# Patient Record
Sex: FEMALE | Race: WHITE | Employment: OTHER | URBAN - METROPOLITAN AREA
[De-identification: names, ages, dates, MRNs, and addresses within clinical notes are randomized per-mention and may not be internally consistent; named-entity substitution may affect disease eponyms.]

---

## 2023-10-20 ENCOUNTER — APPOINTMENT (OUTPATIENT)
Dept: ULTRASOUND IMAGING | Facility: CLINIC | Age: 63
DRG: 857 | End: 2023-10-20
Attending: STUDENT IN AN ORGANIZED HEALTH CARE EDUCATION/TRAINING PROGRAM
Payer: MEDICARE

## 2023-10-20 ENCOUNTER — APPOINTMENT (OUTPATIENT)
Dept: GENERAL RADIOLOGY | Facility: CLINIC | Age: 63
DRG: 857 | End: 2023-10-20
Attending: EMERGENCY MEDICINE
Payer: MEDICARE

## 2023-10-20 ENCOUNTER — HOSPITAL ENCOUNTER (INPATIENT)
Facility: CLINIC | Age: 63
LOS: 4 days | Discharge: HOME | DRG: 857 | End: 2023-10-24
Attending: STUDENT IN AN ORGANIZED HEALTH CARE EDUCATION/TRAINING PROGRAM | Admitting: STUDENT IN AN ORGANIZED HEALTH CARE EDUCATION/TRAINING PROGRAM
Payer: MEDICARE

## 2023-10-20 DIAGNOSIS — L03.032 CELLULITIS OF TOE OF LEFT FOOT: ICD-10-CM

## 2023-10-20 DIAGNOSIS — S91.109D OPEN WOUND OF TOE, SUBSEQUENT ENCOUNTER: ICD-10-CM

## 2023-10-20 DIAGNOSIS — I73.9 PAD (PERIPHERAL ARTERY DISEASE) (H): Primary | Chronic | ICD-10-CM

## 2023-10-20 LAB
ALBUMIN SERPL BCG-MCNC: 4.1 G/DL (ref 3.5–5.2)
ALP SERPL-CCNC: 92 U/L (ref 35–104)
ALT SERPL W P-5'-P-CCNC: 18 U/L (ref 0–50)
ANION GAP SERPL CALCULATED.3IONS-SCNC: 8 MMOL/L (ref 7–15)
AST SERPL W P-5'-P-CCNC: 21 U/L (ref 0–45)
BILIRUB SERPL-MCNC: 0.3 MG/DL
BUN SERPL-MCNC: 10 MG/DL (ref 8–23)
CALCIUM SERPL-MCNC: 9.4 MG/DL (ref 8.8–10.2)
CHLORIDE SERPL-SCNC: 102 MMOL/L (ref 98–107)
CHOLEST SERPL-MCNC: 184 MG/DL
CREAT SERPL-MCNC: 0.46 MG/DL (ref 0.51–0.95)
CRP SERPL-MCNC: 7.41 MG/L
DEPRECATED HCO3 PLAS-SCNC: 27 MMOL/L (ref 22–29)
EGFRCR SERPLBLD CKD-EPI 2021: >90 ML/MIN/1.73M2
ERYTHROCYTE [DISTWIDTH] IN BLOOD BY AUTOMATED COUNT: 14.7 % (ref 10–15)
ERYTHROCYTE [SEDIMENTATION RATE] IN BLOOD BY WESTERGREN METHOD: 8 MM/HR (ref 0–30)
GLUCOSE SERPL-MCNC: 122 MG/DL (ref 70–99)
HBA1C MFR BLD: 5.6 %
HCT VFR BLD AUTO: 40.1 % (ref 35–47)
HDLC SERPL-MCNC: 50 MG/DL
HGB BLD-MCNC: 13.4 G/DL (ref 11.7–15.7)
HOLD SPECIMEN: NORMAL
LDLC SERPL CALC-MCNC: 118 MG/DL
MCH RBC QN AUTO: 33.1 PG (ref 26.5–33)
MCHC RBC AUTO-ENTMCNC: 33.4 G/DL (ref 31.5–36.5)
MCV RBC AUTO: 99 FL (ref 78–100)
NONHDLC SERPL-MCNC: 134 MG/DL
PLATELET # BLD AUTO: 145 10E3/UL (ref 150–450)
POTASSIUM SERPL-SCNC: 3.7 MMOL/L (ref 3.4–5.3)
PROT SERPL-MCNC: 6.5 G/DL (ref 6.4–8.3)
RBC # BLD AUTO: 4.05 10E6/UL (ref 3.8–5.2)
SODIUM SERPL-SCNC: 137 MMOL/L (ref 135–145)
TRIGL SERPL-MCNC: 78 MG/DL
WBC # BLD AUTO: 7.2 10E3/UL (ref 4–11)

## 2023-10-20 PROCEDURE — 80053 COMPREHEN METABOLIC PANEL: CPT | Performed by: STUDENT IN AN ORGANIZED HEALTH CARE EDUCATION/TRAINING PROGRAM

## 2023-10-20 PROCEDURE — 80053 COMPREHEN METABOLIC PANEL: CPT | Performed by: EMERGENCY MEDICINE

## 2023-10-20 PROCEDURE — 85027 COMPLETE CBC AUTOMATED: CPT | Performed by: EMERGENCY MEDICINE

## 2023-10-20 PROCEDURE — 80061 LIPID PANEL: CPT | Performed by: STUDENT IN AN ORGANIZED HEALTH CARE EDUCATION/TRAINING PROGRAM

## 2023-10-20 PROCEDURE — 250N000011 HC RX IP 250 OP 636: Mod: JZ | Performed by: STUDENT IN AN ORGANIZED HEALTH CARE EDUCATION/TRAINING PROGRAM

## 2023-10-20 PROCEDURE — 99285 EMERGENCY DEPT VISIT HI MDM: CPT | Mod: 25

## 2023-10-20 PROCEDURE — 120N000001 HC R&B MED SURG/OB

## 2023-10-20 PROCEDURE — 99221 1ST HOSP IP/OBS SF/LOW 40: CPT | Performed by: PODIATRIST

## 2023-10-20 PROCEDURE — 250N000013 HC RX MED GY IP 250 OP 250 PS 637: Performed by: STUDENT IN AN ORGANIZED HEALTH CARE EDUCATION/TRAINING PROGRAM

## 2023-10-20 PROCEDURE — 85027 COMPLETE CBC AUTOMATED: CPT | Performed by: STUDENT IN AN ORGANIZED HEALTH CARE EDUCATION/TRAINING PROGRAM

## 2023-10-20 PROCEDURE — 85652 RBC SED RATE AUTOMATED: CPT | Performed by: STUDENT IN AN ORGANIZED HEALTH CARE EDUCATION/TRAINING PROGRAM

## 2023-10-20 PROCEDURE — 250N000011 HC RX IP 250 OP 636: Performed by: STUDENT IN AN ORGANIZED HEALTH CARE EDUCATION/TRAINING PROGRAM

## 2023-10-20 PROCEDURE — 93922 UPR/L XTREMITY ART 2 LEVELS: CPT

## 2023-10-20 PROCEDURE — 36415 COLL VENOUS BLD VENIPUNCTURE: CPT | Performed by: STUDENT IN AN ORGANIZED HEALTH CARE EDUCATION/TRAINING PROGRAM

## 2023-10-20 PROCEDURE — 93925 LOWER EXTREMITY STUDY: CPT

## 2023-10-20 PROCEDURE — 258N000003 HC RX IP 258 OP 636: Performed by: STUDENT IN AN ORGANIZED HEALTH CARE EDUCATION/TRAINING PROGRAM

## 2023-10-20 PROCEDURE — 83036 HEMOGLOBIN GLYCOSYLATED A1C: CPT | Performed by: STUDENT IN AN ORGANIZED HEALTH CARE EDUCATION/TRAINING PROGRAM

## 2023-10-20 PROCEDURE — 87040 BLOOD CULTURE FOR BACTERIA: CPT | Performed by: STUDENT IN AN ORGANIZED HEALTH CARE EDUCATION/TRAINING PROGRAM

## 2023-10-20 PROCEDURE — 36415 COLL VENOUS BLD VENIPUNCTURE: CPT | Performed by: EMERGENCY MEDICINE

## 2023-10-20 PROCEDURE — 99223 1ST HOSP IP/OBS HIGH 75: CPT | Mod: AI | Performed by: STUDENT IN AN ORGANIZED HEALTH CARE EDUCATION/TRAINING PROGRAM

## 2023-10-20 PROCEDURE — 86140 C-REACTIVE PROTEIN: CPT | Performed by: STUDENT IN AN ORGANIZED HEALTH CARE EDUCATION/TRAINING PROGRAM

## 2023-10-20 PROCEDURE — 73630 X-RAY EXAM OF FOOT: CPT | Mod: LT

## 2023-10-20 RX ORDER — HYDROCODONE BITARTRATE AND ACETAMINOPHEN 5; 325 MG/1; MG/1
1 TABLET ORAL EVERY 6 HOURS PRN
Status: DISCONTINUED | OUTPATIENT
Start: 2023-10-20 | End: 2023-10-24 | Stop reason: HOSPADM

## 2023-10-20 RX ORDER — ASPIRIN 81 MG/1
81 TABLET ORAL DAILY
Status: DISCONTINUED | OUTPATIENT
Start: 2023-10-20 | End: 2023-10-24 | Stop reason: HOSPADM

## 2023-10-20 RX ORDER — ACYCLOVIR 400 MG/1
400 TABLET ORAL DAILY
Status: DISCONTINUED | OUTPATIENT
Start: 2023-10-20 | End: 2023-10-24 | Stop reason: HOSPADM

## 2023-10-20 RX ORDER — SERTRALINE HYDROCHLORIDE 100 MG/1
100 TABLET, FILM COATED ORAL DAILY
COMMUNITY

## 2023-10-20 RX ORDER — ONDANSETRON 2 MG/ML
4 INJECTION INTRAMUSCULAR; INTRAVENOUS EVERY 6 HOURS PRN
Status: DISCONTINUED | OUTPATIENT
Start: 2023-10-20 | End: 2023-10-24 | Stop reason: HOSPADM

## 2023-10-20 RX ORDER — ACETAMINOPHEN 325 MG/1
650 TABLET ORAL EVERY 4 HOURS PRN
Status: DISCONTINUED | OUTPATIENT
Start: 2023-10-20 | End: 2023-10-23

## 2023-10-20 RX ORDER — ONDANSETRON 4 MG/1
4 TABLET, ORALLY DISINTEGRATING ORAL EVERY 6 HOURS PRN
Status: DISCONTINUED | OUTPATIENT
Start: 2023-10-20 | End: 2023-10-24 | Stop reason: HOSPADM

## 2023-10-20 RX ORDER — ATORVASTATIN CALCIUM 20 MG/1
20 TABLET, FILM COATED ORAL DAILY
Status: ON HOLD | COMMUNITY
End: 2023-10-24

## 2023-10-20 RX ORDER — ATORVASTATIN CALCIUM 40 MG/1
80 TABLET, FILM COATED ORAL EVERY EVENING
Status: DISCONTINUED | OUTPATIENT
Start: 2023-10-20 | End: 2023-10-24 | Stop reason: HOSPADM

## 2023-10-20 RX ORDER — POLYETHYLENE GLYCOL 3350 17 G/17G
17 POWDER, FOR SOLUTION ORAL DAILY PRN
Status: DISCONTINUED | OUTPATIENT
Start: 2023-10-20 | End: 2023-10-24 | Stop reason: HOSPADM

## 2023-10-20 RX ORDER — HYDROCODONE BITARTRATE AND ACETAMINOPHEN 5; 325 MG/1; MG/1
1 TABLET ORAL EVERY 6 HOURS PRN
Status: ON HOLD | COMMUNITY
Start: 2023-10-16 | End: 2023-10-24

## 2023-10-20 RX ORDER — NICOTINE 21 MG/24HR
1 PATCH, TRANSDERMAL 24 HOURS TRANSDERMAL DAILY
Status: DISCONTINUED | OUTPATIENT
Start: 2023-10-20 | End: 2023-10-24 | Stop reason: HOSPADM

## 2023-10-20 RX ORDER — AMLODIPINE BESYLATE 10 MG/1
10 TABLET ORAL DAILY
COMMUNITY

## 2023-10-20 RX ORDER — PANTOPRAZOLE SODIUM 40 MG/1
40 TABLET, DELAYED RELEASE ORAL DAILY
Status: DISCONTINUED | OUTPATIENT
Start: 2023-10-20 | End: 2023-10-24 | Stop reason: HOSPADM

## 2023-10-20 RX ORDER — CETIRIZINE HYDROCHLORIDE 10 MG/1
10 TABLET ORAL DAILY
COMMUNITY

## 2023-10-20 RX ORDER — CEFEPIME HYDROCHLORIDE 2 G/1
2 INJECTION, POWDER, FOR SOLUTION INTRAVENOUS EVERY 8 HOURS
Status: DISCONTINUED | OUTPATIENT
Start: 2023-10-20 | End: 2023-10-24

## 2023-10-20 RX ORDER — CEPHALEXIN 500 MG/1
500 CAPSULE ORAL 4 TIMES DAILY
Status: ON HOLD | COMMUNITY
Start: 2023-10-16 | End: 2023-10-24

## 2023-10-20 RX ORDER — ATORVASTATIN CALCIUM 20 MG/1
20 TABLET, FILM COATED ORAL EVERY EVENING
Status: DISCONTINUED | OUTPATIENT
Start: 2023-10-20 | End: 2023-10-20

## 2023-10-20 RX ORDER — ACYCLOVIR 400 MG/1
400 TABLET ORAL DAILY
COMMUNITY

## 2023-10-20 RX ORDER — AMLODIPINE BESYLATE 10 MG/1
10 TABLET ORAL DAILY
Status: DISCONTINUED | OUTPATIENT
Start: 2023-10-20 | End: 2023-10-24 | Stop reason: HOSPADM

## 2023-10-20 RX ORDER — SERTRALINE HYDROCHLORIDE 100 MG/1
100 TABLET, FILM COATED ORAL DAILY
Status: DISCONTINUED | OUTPATIENT
Start: 2023-10-20 | End: 2023-10-24 | Stop reason: HOSPADM

## 2023-10-20 RX ADMIN — ATORVASTATIN CALCIUM 80 MG: 40 TABLET, FILM COATED ORAL at 21:52

## 2023-10-20 RX ADMIN — ONDANSETRON 4 MG: 2 INJECTION INTRAMUSCULAR; INTRAVENOUS at 19:07

## 2023-10-20 RX ADMIN — CEFEPIME HYDROCHLORIDE 2 G: 2 INJECTION, POWDER, FOR SOLUTION INTRAVENOUS at 15:54

## 2023-10-20 RX ADMIN — PANTOPRAZOLE SODIUM 40 MG: 40 TABLET, DELAYED RELEASE ORAL at 18:22

## 2023-10-20 RX ADMIN — ACETAMINOPHEN 650 MG: 325 TABLET, FILM COATED ORAL at 21:57

## 2023-10-20 RX ADMIN — AMLODIPINE BESYLATE 10 MG: 10 TABLET ORAL at 18:22

## 2023-10-20 RX ADMIN — ACYCLOVIR 400 MG: 400 TABLET ORAL at 21:52

## 2023-10-20 RX ADMIN — ASPIRIN 81 MG: 81 TABLET, COATED ORAL at 21:52

## 2023-10-20 RX ADMIN — NICOTINE 1 PATCH: 21 PATCH, EXTENDED RELEASE TRANSDERMAL at 18:23

## 2023-10-20 RX ADMIN — SERTRALINE HYDROCHLORIDE 100 MG: 100 TABLET ORAL at 18:22

## 2023-10-20 RX ADMIN — VANCOMYCIN HYDROCHLORIDE 1500 MG: 10 INJECTION, POWDER, LYOPHILIZED, FOR SOLUTION INTRAVENOUS at 18:48

## 2023-10-20 ASSESSMENT — ACTIVITIES OF DAILY LIVING (ADL)
ADLS_ACUITY_SCORE: 35

## 2023-10-20 NOTE — CONSULTS
[facility]  CONSULTATION NOTE    CHIEF COMPLAINT:      I was asked by Elana Hernandez MD  to evaluate this patient for left foot    PATIENT HISTORY:  Pili Cuevas is a 63 year old female  with a past medical history significant for what's listed below. She presented for worsening left hallux pain and redness, following an ingrown toenail removal approx 1 week ago by her primary care doctor. She states this was done in [place]. No records available for review in our system. States her PCP then sent her to her podiatrist, who sent her to vascular located in [place]. Per patient, vascular stated she did have some level of vascular disease, but without need for surgery. No documentation for these encounters are available, that I can see.   -At bedside, states site is tender, particularly adjacent skin. No history of diabetes. Does have a history of smoking and ovarian cancer, requiring chemotherapy approx 2 years ago. She now has recurrence of the ovarian cancer with plans to start chemo again soon.         Review of Systems:  A 10 point review of systems was performed and is positive for that noted above in the patient history.  All other areas are negative.     PAST MEDICAL HISTORY:   Past Medical History:   Diagnosis Date    Depression     History of ovarian cancer     S/p surgery and chemo 2021, now with reoccurance 2023    HLD (hyperlipidemia)     HTN (hypertension)         PAST SURGICAL HISTORY:   Past Surgical History:   Procedure Laterality Date    C6-7 fusion      HYSTERECTOMY TOTAL ABDOMINAL  2021    Instrumented arthrodesis of the first MTP joint with osseous fusion          MEDICATIONS:  Reviewed in Epic. Current.     ALLERGIES:    Allergies   Allergen Reactions    Epinephrine     Trazodone Dizziness        SOCIAL HISTORY:   Social History     Socioeconomic History    Marital status: Single     Spouse name: Not on file    Number of children: Not on file    Years of  "education: Not on file    Highest education level: Not on file   Occupational History    Not on file   Tobacco Use    Smoking status: Every Day     Packs/day: 1     Types: Cigarettes    Smokeless tobacco: Not on file   Substance and Sexual Activity    Alcohol use: Not on file    Drug use: Not on file    Sexual activity: Not on file   Other Topics Concern    Not on file   Social History Narrative    Not on file     Social Determinants of Health     Financial Resource Strain: Not on file   Food Insecurity: Not on file   Transportation Needs: Not on file   Physical Activity: Not on file   Stress: Not on file   Social Connections: Not on file   Interpersonal Safety: Not on file   Housing Stability: Not on file        FAMILY HISTORY: No family history on file.     EXAM:Vitals: BP (!) 174/88   Pulse 68   Temp 97.9  F (36.6  C) (Temporal)   Resp 16   Ht 1.707 m (5' 7.2\")   Wt 90.7 kg (200 lb)   SpO2 95%   BMI 31.14 kg/m    BMI= Body mass index is 31.14 kg/m .    LABS:     Last Comprehensive Metabolic Panel:  Sodium   Date Value Ref Range Status   10/20/2023 137 135 - 145 mmol/L Final     Comment:     Reference intervals for this test were updated on 09/26/2023 to more accurately reflect our healthy population. There may be differences in the flagging of prior results with similar values performed with this method. Interpretation of those prior results can be made in the context of the updated reference intervals.      Potassium   Date Value Ref Range Status   10/20/2023 3.7 3.4 - 5.3 mmol/L Final     Chloride   Date Value Ref Range Status   10/20/2023 102 98 - 107 mmol/L Final     Carbon Dioxide (CO2)   Date Value Ref Range Status   10/20/2023 27 22 - 29 mmol/L Final     Anion Gap   Date Value Ref Range Status   10/20/2023 8 7 - 15 mmol/L Final     Glucose   Date Value Ref Range Status   10/20/2023 122 (H) 70 - 99 mg/dL Final     Urea Nitrogen   Date Value Ref Range Status   10/20/2023 10.0 8.0 - 23.0 mg/dL Final " "    Creatinine   Date Value Ref Range Status   10/20/2023 0.46 (L) 0.51 - 0.95 mg/dL Final     GFR Estimate   Date Value Ref Range Status   10/20/2023 >90 >60 mL/min/1.73m2 Final     Calcium   Date Value Ref Range Status   10/20/2023 9.4 8.8 - 10.2 mg/dL Final     Lab Results   Component Value Date    WBC 7.2 10/20/2023     Lab Results   Component Value Date    RBC 4.05 10/20/2023     Lab Results   Component Value Date    HGB 13.4 10/20/2023     Lab Results   Component Value Date    HCT 40.1 10/20/2023     Lab Results   Component Value Date     10/20/2023      No results found for: \"INR\"     General appearance: Patient is alert and fully cooperative with history & exam.  No sign of distress is noted during the visit.      Respiratory: Breathing is regular & unlabored while sitting.      HEENT: Hearing is intact to spoken word.  Speech is clear.  No gross evidence of visual impairment that would impact ambulation.      Dermatologic: Left hallux ischemia s/p nail avulsion. Distal medial digit with the most pronounced devascularization. Not full thickness. No purulence. Minimal ascending cellulitis to level of MPJ. Distal hallux slightly violaceous. Not cool touch.      Vascular: Dorsalis pedis and posterior tibial pulses are weakly palpable.       Neurologic: Lower extremity sensation is diminished, bilateral foot, to light touch.  No evidence of neurological-based weakness or contracture in the lower extremities.       Musculoskeletal: Patient is ambulatory without an assistive device or brace.  No gross foot or ankle deformity noted.       Psychiatric: Affect is pleasant & appropriate.      All cultures:  No results for input(s): \"CULTURE\" in the last 168 hours.     IMAGING:     IMPRESSION:  1.  No fracture, focal bone erosion, or joint malalignment.  2.  Instrumented arthrodesis of the first MTP joint with osseous  fusion. The hardware is intact.  3.  Achilles calcaneal spur.    I personally reviewed the " images and agree with the reports as stated.  No obvious signs of osteomyelitis     ASSESSMENT:  Left hallux devascularization following nail excision   Peripheral arterial disease      MEDICAL DECISION MAKING:   -Discussed all findings with patient. Chart and imaging reviewed.   -No obvious signs of osteomyelitis on xray. Clinically no significant signs of deep space infection.   -Discussed with her signs of ischemia which if continues to worsen, could ultimately lead to an amputation.   -Consult placed to vascular for assistance. Will hold off on order vascular studies, in case records can be obtained from outside facility, but discussed with patient they may need to be redone.   -Cont IV abx   -Okay to leave out to air for now.   -No weightbearing restrictions.       Thank you for the consultation request and the opportunity to participate in the care of Pili Royal DPM   [facility] of Podiatry/Foot & Ankle Surgery  [phone]

## 2023-10-20 NOTE — H&P
[facility]  History and Physical - Hospitalist Service       Date of Admission:  10/20/2023  PRIMARY CARE PROVIDER:    Nickie Boyd    Assessment & Plan   Pili STOUT Faith is a 63 year old female with a past medical history significant for ovarian cancer s/p surgery and chemo 2021 with reoccurrence in 2023 not currently on treatment, HTN, HLD, oral herpes, depression who presented to the ED on 10/20/23 for left toe pain. Patient was admitted on 10/20/23 left big toe cellulitis.     Left Big Toe Cellulitis   ?Possible Ischemia of Left Big Toe  PAD   Had toenail removed on 10/13/23 with PCP, started to worsen over the weekend. Saw PCP on 10/16/23 who had Podiatry see her, who started Abx and referred to Vascular Surgery. Was diagnosed with PAD but no intervention to be done. Pain, swelling and redness has been getting worse despite outpt abx. Patient is afebrile, vitally stable, has no WBC but does have an elevated CRP. Xray not showing osteo at this time but concerning for ?necrotic area on toe. Hx of history left first MTP.      - CRP: 7.41, ESR: 8   - Blood cx pending       - Podiatry Consulted     - Consulted Vascular Surgery      - Start Cefepime and Vancomycin    - Continue home Hydrocodone 3-352mg Q6H PRN     Chronic Medical Problems:     HTN   - Continue home Amlodipine 10mg daily     GERD   - Continue home Omeprazole 20mg daily    Depression    - Continue home Zoloft 100mg daily     Oral Herpes   - Continue home Acyclovir 400mg daily     Tobacco Abuse  Smokes a pack of day   - Nicotine patch ordered     Ovarian Cancer   Initially diagnosed in March 2021. Underwent MEENU and chemo. Was in remission until a reoccurrence was found in September 2023. She is in the process of moving to [place] and has an appointment with [facility] for next steps at the end of the month.       Clinically Significant Risk Factors Present on Admission                       #  "Obesity: Estimated body mass index is 31.14 kg/m  as calculated from the following:    Height as of this encounter: 1.707 m (5' 7.2\").    Weight as of this encounter: 90.7 kg (200 lb).               Diet: Combination Diet Regular Diet Adult  DVT Prophylaxis: Pneumatic Compression Devices  Perez Catheter: Not present  Lines: None     Cardiac Monitoring: None  Code Status: Full Code         Disposition Plan      Expected Discharge Date: 10/21/2023             Entered: Elana Hernandez MD 10/20/2023, 3:15 PM       Elana Hernandez MD  Hospitalist Service   [facility]  Securely message with the Vocera Web Console (learn more here)  Text page via [employer] Paging/Directory   ______________________________________________________________    Chief Complaint   Left Toe Pain    History is obtained from the patient and EMR.      History of Present Illness   Pili Cuevas is a 63 year old female with a past medical history significant for ovarian cancer s/p surgery and chemo 2021 with reoccurrence in 2023 not currently on treatment, HTN, HLD, oral herpes, depression who presented to the ED on 10/20/23 for left toe pain.    (Unable to see records for PCP or Vascular Surgery)     The patient states that on Friday 10/13/23 her PCP removed her left big toe toenail due to it being overgrown. She states that over the weekend some redness had developed. She was told to soak her left foot in warm water. On Monday 10/16/23 she saw her PCP again who was worried with how it was looking. Her PCP had Podiatry look at it. Patient states that Podiatry cleaned it and recommend that she see Vascular Surgery. Podiatry also prescribed Keflex 500mg QID for 1 week. She saw Vascular on 10/17/23 who told her that she has PAD but does not need surgery and it should not interfere with her healing. She states that the redness, swelling and pain slowly got worse. The redness started to track down her old surgical scar. " The swelling started to go into her ankle and the pain is now present at rest. She also states that the dark spot near the tip of the toe has maybe gotten worse. She denies fevers and chills.     Hx of history left first MTP.     Does report a history of right thigh cellulitis where she had 2 hospitalizations and extended abx therapy.     10/14/23 Pre-Soak      10/14/23 Post Soak      10/20/23 in ED      10/20/23 in ED      In the ED:   Vitals upon arrival:   Temp: 97.9F, BP: 172/93, HR: 75, RR: 16. Spo2: 96% on RA     Labs:   Na:137, K: 3.7, Cl:102, CO2: 27  BUN:10, Cr: 0.46    WBC: 7.2, Hgb: 13.4, Plt: 145    Tbili: 0.3, AST: 21, ALT: 18, ALKP: 92     Imaging:     CXR:   1.  No fracture, focal bone erosion, or joint malalignment.  2.  Instrumented arthrodesis of the first MTP joint with osseous  fusion. The hardware is intact.  3.  Achilles calcaneal spur.    In the ED, she was given a dose of Vancomycin.     Upon my evaluation, the patient is laying in bed with family at bedside. She intermittently winces in pain. States that she is doing okay.     Patient recently diagnosed with reoccurrence of her Ovarian cancer in Sept 2023. She is in the process of moving to [place] and has an appointment with [facility] for next steps at the end of the month.     Currently smokes roughly a pack a day. Drink 3-5 beers 2-3 times a week. No illicit drugs.     Currently denies headache, subjective fevers, chills, lightheadedness, chest pain SOB, abdominal pain, nausea, vomiting.       Past Medical History    I have reviewed this patient's medical history and updated it with pertinent information if needed.   Past Medical History:   Diagnosis Date    Depression     History of ovarian cancer     S/p surgery and chemo 2021, now with reoccurance 2023    HLD (hyperlipidemia)     HTN (hypertension)        Prior to Admission Medications   None     Allergies   Allergies   Allergen Reactions    Epinephrine      Trazodone Dizziness       Physical Exam   Vital Signs: Temp: 97.9  F (36.6  C) Temp src: Temporal BP: (!) 174/88 Pulse: 68   Resp: 16 SpO2: 95 %      Weight: 200 lbs 0 oz    Constitutional: Awake, alert, cooperative, intermittently wincing in pain   ENT: Normocephalic, without obvious abnormality, atraumatic, oral pharynx with moist mucus membranes.  Eyes pupils are equal, round and reactive to light; extra occular movements intact.  Normal sclera.    Pulmonary: No increased work of breathing, good air exchange, clear to auscultation bilaterally, no crackles or wheezing.  Cardiovascular: Regular rate and rhythm, normal S1 and S2  GI: Normal bowel sounds, soft, non-distended, non-tender.  Obese.  Skin/Integumen: Please see pictures below   Neuro: Moves all 4 extremities   Psych:  Alert and oriented x 3. Normal affect.  Extremities:   L Big Toe: red, swollen, tender, black/dark area near tip, redness tracking down old surgical scar   LLE: swelling in ankle and into calf, does appear more swollen than R leg   : Perez catheter in place with clear yellow urine in the bag.                Medical Decision Making       Please see A&P for additional details of medical decision making.  65 MINUTES SPENT BY ME on the date of service doing chart review, history, exam, documentation & further activities per the note.         Data   Data reviewed today: I reviewed all medications, new labs and imaging results over the last 24 hours. I personally reviewed no images or EKG's today.      I have personally reviewed the following data over the past 24 hrs:    7.2  \   13.4   / 145 (L)     137 102 10.0 /  122 (H)   3.7 27 0.46 (L) \     ALT: 18 AST: 21 AP: 92 TBILI: 0.3   ALB: 4.1 TOT PROTEIN: 6.5 LIPASE: N/A       Imaging results reviewed over the past 24 hrs:   Recent Results (from the past 24 hour(s))   Foot XR, G/E 3 views, left    Narrative    FOOT LEFT THREE OR MORE VIEWS   DATE/TIME: 10/20/2023 11:33 AM     INDICATION: Left  foot infection.   COMPARISON: None.      Impression    IMPRESSION:  1.  No fracture, focal bone erosion, or joint malalignment.  2.  Instrumented arthrodesis of the first MTP joint with osseous  fusion. The hardware is intact.  3.  Achilles calcaneal spur.       COCO MCCOLLUM MD         SYSTEM ID:  [id number]

## 2023-10-20 NOTE — ED TRIAGE NOTES
Had left big toe nail removed a week ago. Patient taking Keflex since 10/16. Went to vascular doctor this week and was found to have vascular issues to left leg. Patient has ovarian cancer- not undergoing chemo or treatment at this time.    Wound to left foot worsening, redness and swelling going up further into foot.    7:15AM today took hydrocodone.

## 2023-10-20 NOTE — PHARMACY-ADMISSION MEDICATION HISTORY
Pharmacist Admission Medication History    Admission medication history is complete. The information provided in this note is only as accurate as the sources available at the time of the update.    Information Source(s): Patient and CareEverywhere/SureScripts via in-person    Pertinent Information:   Recent antimicrobials:  On 10/16, patient was prescribed Keflex for SSTI with instructions of QID x 7 days. Patient has taken 17 caps of the 28 dose course of antibiotics so far and has 11 doses remaining. Course will be completed on 10/23    Changes made to PTA medication list:  Added: All meds added to list  Deleted: None  Changed: None    Allergies reviewed with patient and updates made in EHR: yes    Medication History Completed By: Bhavna Lauren RPH 10/20/2023 4:06 PM    PTA Med List   Medication Sig Last Dose    acyclovir (ZOVIRAX) 400 MG tablet Take 400 mg by mouth daily 10/19/2023    amLODIPine (NORVASC) 10 MG tablet Take 10 mg by mouth daily 10/19/2023    atorvastatin (LIPITOR) 20 MG tablet Take 20 mg by mouth daily 10/19/2023    cephALEXin (KEFLEX) 500 MG capsule Take 500 mg by mouth 4 times daily 10/20/2023 at x 2 doses    cetirizine (ZYRTEC) 10 MG tablet Take 10 mg by mouth daily 10/19/2023    HYDROcodone-acetaminophen (NORCO) 5-325 MG tablet Take 1 tablet by mouth every 6 hours as needed for pain 10/20/2023 at AM    omeprazole (PRILOSEC) 20 MG DR capsule Take 20 mg by mouth daily 10/19/2023    sertraline (ZOLOFT) 100 MG tablet Take 100 mg by mouth daily 10/19/2023

## 2023-10-20 NOTE — ED NOTES
"[facility]  ED Nurse Handoff Report    ED Chief complaint: Wound Check      ED Diagnosis:   Final diagnoses:   Cellulitis of toe of left foot       Code Status: to be addressed by admitting MD    Allergies:   Allergies   Allergen Reactions    Epinephrine     Trazodone Dizziness       Patient Story: 63 year old with cellulitis of the left great toe following surgical removal of ingrown toe nails earlier this week.  Patient also ovarian cancer for the second time and a visit to [place] is pending, she is not on chemo.     Focused Assessment:  awake, alert, approriate, left great toe has a black nail and reddness going up the dorsum     Treatments and/or interventions provided: IV antibiotics    Patient's response to treatments and/or interventions:     To be done/followed up on inpatient unit:      Does this patient have any cognitive concerns?:       Activity level - Baseline/Home:  Independent  Activity Level - Current:   Stand with Assist    Patient's Preferred language: English   Needed?: No    Isolation: None  Infection: Not Applicable  Patient tested for COVID 19 prior to admission: NO  Bariatric?: No    Vital Signs:   Vitals:    10/20/23 1035 10/20/23 1458 10/20/23 1459   BP: (!) 172/93 (!) 174/88    Pulse: 75 68    Resp: 16     Temp: 97.9  F (36.6  C)     TempSrc: Temporal     SpO2: 96%  95%   Weight: 90.7 kg (200 lb)     Height: 1.707 m (5' 7.2\")         Cardiac Rhythm:     Was the PSS-3 completed:   Yes  What interventions are required if any?               Family Comments: sister at bedside, supportive  OBS brochure/video discussed/provided to patient/family: N/A              Name of person given brochure if not patient:               Relationship to patient:     For the majority of the shift this patient's behavior was Green.   Behavioral interventions performed were .    ED NURSE PHONE NUMBER: [phone]         "

## 2023-10-20 NOTE — PHARMACY-VANCOMYCIN DOSING SERVICE
Pharmacy Vancomycin Initial Note  Date of Service 2023  Patient's  1960  63 year old, female    Indication: Skin and Soft Tissue Infection    Current estimated CrCl = Estimated Creatinine Clearance: 149 mL/min (A) (based on SCr of 0.46 mg/dL (L)).    Creatinine for last 3 days  10/20/2023: 11:01 AM Creatinine 0.46 mg/dL    Nephrotoxins and other renal medications (From now, onward)      Start     Dose/Rate Route Frequency Ordered Stop    10/20/23 2000  acyclovir (ZOVIRAX) tablet 400 mg        Note to Pharmacy: PTA Sig:Take 400 mg by mouth daily      400 mg Oral DAILY 10/20/23 1611      10/20/23 1800  vancomycin (VANCOCIN) 1,500 mg in 0.9% NaCl 250 mL intermittent infusion         1,500 mg  over 90 Minutes Intravenous EVERY 12 HOURS 10/20/23 1702            [employer] Prediction of Planned Initial Vancomycin Regimen  Regimen: 1500 mg IV every 12 hours.  Start time: 17:51 on 10/20/2023  Exposure target: AUC24 (range)400-600 mg/L.hr   AUC24,ss: 549 mg/L.hr  Probability of AUC24 > 400: 81 %  Ctrough,ss: 16.3 mg/L  Probability of Ctrough,ss > 20: 34 %  Probability of nephrotoxicity (Lodise CARY ): 12 %          Plan:  Start vancomycin  1500 mg IV q12h.   Vancomycin monitoring method: AUC  Vancomycin therapeutic monitoring goal: 400-600 mg*h/L  Pharmacy will check vancomycin levels as appropriate in 1-3 Days.    Serum creatinine levels will be ordered every 48 hours.      Karlos Ospina RP

## 2023-10-20 NOTE — PLAN OF CARE
Goal Outcome Evaluation:    Pt has past medical history significant for ovarian cancer s/p surgery and chemo 2021 with reoccurrence in 2023 not currently on treatment, HTN, HLD, oral herpes, depression who presented to the ED on 10/20/23 for left toe pain. Patient was admitted today for left big toe cellulitis. A&Ox4.  Up independently.  Able to bear weight on LLE.  Left big toe reddened with tenderness, swelling and warmth.  Rates 2/10 tingling pain in Left big toe.  Pedal and posttibial pulses dopplerable.  On regular diet; ordered dinner.  VSS except HTNsive and T-99.3.  Received prn IV Zofran for c/o nausea.  Seen by Vascular Surgery this shift.  Went down for Ultrasound-LLE.  On IV Vancomycin.  Blood cx pending.

## 2023-10-20 NOTE — ED PROVIDER NOTES
"    History     Chief Complaint:  Wound Check       HPI   Pili Cuevas is a 63 year old female history of stage IV ovarian cancer s/p surgery chemo 21 with now recurrence but not currently on treatment, hypertension, lipidemia, presenting with left toe pain and erythema.  Patient had her toenail removed 1 week ago.  Few days after she developed pain and redness so was started on Keflex and she was seen by podiatry.  Patient also saw vascular surgery 2 days prior and was told that she has peripheral vascular disease but does not require surgery.  She presents today because despite antibiotics she has continued pain with big toe and the redness is spreading.  She has had surgery prior on the left first MTP.  No fevers, chills, or body aches.  No shortness of breath.      Independent Historian:    none    Review of External Notes:  none      Medications:    No current outpatient medications on file.      Past Medical History:    Past Medical History:   Diagnosis Date    Depression     History of ovarian cancer     HLD (hyperlipidemia)     HTN (hypertension)        Past Surgical History:    Past Surgical History:   Procedure Laterality Date    C6-7 fusion      HYSTERECTOMY TOTAL ABDOMINAL  2021    Instrumented arthrodesis of the first MTP joint with osseous fusion            Physical Exam   Patient Vitals for the past 24 hrs:   BP Temp Temp src Pulse Resp SpO2 Height Weight   10/20/23 1459 -- -- -- -- -- 95 % -- --   10/20/23 1458 (!) 174/88 -- -- 68 -- -- -- --   10/20/23 1035 (!) 172/93 97.9  F (36.6  C) Temporal 75 16 96 % 1.707 m (5' 7.2\") 90.7 kg (200 lb)        Physical Exam  GENERAL: Patient well-appearing  HEAD: Atraumatic.  NECK: No rigidity  CV: RRR, no murmurs rubs or gallops  PULM: CTAB with good aeration; no retractions, rales, rhonchi, or wheezing  ABD: Soft, nontender, nondistended, no guarding  DERM: Left first toe-no crepitus or bullae.  The distal nailbed has dark material with the may be necrosis " versus healing scabbed wound.  There is no foul smell.  No pus drainage.  EXTREMITY: Tenderness to palpation and swelling of the left first toe.  Can flex and extend left first toe.  VASCULAR: Left DP pulse 1+.      Emergency Department Course        Imaging:  Foot XR, G/E 3 views, left   Final Result   IMPRESSION:   1.  No fracture, focal bone erosion, or joint malalignment.   2.  Instrumented arthrodesis of the first MTP joint with osseous   fusion. The hardware is intact.   3.  Achilles calcaneal spur.          COCO MCCOLLUM MD            SYSTEM ID:  [id number]        Report per radiology    Laboratory:  Labs Ordered and Resulted from Time of ED Arrival to Time of ED Departure   CBC WITH PLATELETS - Abnormal       Result Value    WBC Count 7.2      RBC Count 4.05      Hemoglobin 13.4      Hematocrit 40.1      MCV 99      MCH 33.1 (*)     MCHC 33.4      RDW 14.7      Platelet Count 145 (*)    COMPREHENSIVE METABOLIC PANEL - Abnormal    Sodium 137      Potassium 3.7      Carbon Dioxide (CO2) 27      Anion Gap 8      Urea Nitrogen 10.0      Creatinine 0.46 (*)     GFR Estimate >90      Calcium 9.4      Chloride 102      Glucose 122 (*)     Alkaline Phosphatase 92      AST 21      ALT 18      Protein Total 6.5      Albumin 4.1      Bilirubin Total 0.3     CRP INFLAMMATION - Abnormal    CRP Inflammation 7.41 (*)    ERYTHROCYTE SEDIMENTATION RATE AUTO - Normal    Erythrocyte Sedimentation Rate 8     BLOOD CULTURE   BLOOD CULTURE             Emergency Department Course & Assessments:             Interventions:  Medications   melatonin tablet 1 mg (has no administration in time range)   HYDROcodone-acetaminophen (NORCO) 5-325 MG per tablet 1 tablet (has no administration in time range)   acetaminophen (TYLENOL) tablet 650 mg (has no administration in time range)   polyethylene glycol (MIRALAX) Packet 17 g (has no administration in time range)   ondansetron (ZOFRAN ODT) ODT tab 4 mg (has no administration in time range)      Or   ondansetron (ZOFRAN) injection 4 mg (has no administration in time range)   nicotine Patch in Place (has no administration in time range)   nicotine (NICODERM CQ) 21 MG/24HR 24 hr patch 1 patch (has no administration in time range)   ceFEPIme (MAXIPIME) 2 g vial to attach to  mL bag for ADULTS or 50 mL bag for PEDS (2 g Intravenous $New Bag 10/20/23 6443)           Independent Interpretation (X-rays, CTs, rhythm strip):  X-ray foot no fracture or gas.    Consultations/Discussion of Management or Tests:  Dr. Hernandez hospitalist       Social Determinants of Health affecting care:  none     Disposition:  The patient was admitted to the hospital under the care of Dr. Hernandez.     Impression & Plan         Medical Decision Making:  Symptoms consistent with cellulitis.      Chronic conditions complicating -ovarian cancer.  Prior extended course of antibiotics for a skin infection.    DDx considered sepsis, osteomyelitis, necrotizing skin infection, compartment syndrome, DVT.    Do not see signs of DVT.  Osteomyelitis seems unlikely with such a brief course.  Patient not septic.  Doubt NSTI.  She does have an area over the distal toe that appears to be mild necrosis versus healing wound.  In comparison to prior imaging with picture she took the lateral aspect of the toenail looked darker but that appeared to heal up but this darker aspect she states has been there for few days.    Labs grossly unremarkable other than CRP slightly elevated.  Afebrile.  Patient has failed outpatient antibiotics so started on IV vancomycin.  She has a palpable pulse in the left foot and the rest of the foot is nontender.  Do not think she requires emergent surgery but likely podiatry evaluation on the inpatient side.    Critical Care time:  was 0 minutes for this patient excluding procedures.    Diagnosis:    ICD-10-CM    1. Cellulitis of toe of left foot  L03.032            Discharge Medications:  New Prescriptions     No medications on file          Omid Rangel MD  10/20/2023   Omid Rangel MD Foss, Kevin, MD  10/20/23 9289

## 2023-10-21 ENCOUNTER — APPOINTMENT (OUTPATIENT)
Dept: MRI IMAGING | Facility: CLINIC | Age: 63
DRG: 857 | End: 2023-10-21
Attending: PODIATRIST
Payer: MEDICARE

## 2023-10-21 PROBLEM — I73.9 PAD (PERIPHERAL ARTERY DISEASE) (H): Status: ACTIVE | Noted: 2023-10-21

## 2023-10-21 PROCEDURE — 258N000003 HC RX IP 258 OP 636: Performed by: STUDENT IN AN ORGANIZED HEALTH CARE EDUCATION/TRAINING PROGRAM

## 2023-10-21 PROCEDURE — 250N000013 HC RX MED GY IP 250 OP 250 PS 637: Performed by: STUDENT IN AN ORGANIZED HEALTH CARE EDUCATION/TRAINING PROGRAM

## 2023-10-21 PROCEDURE — 255N000002 HC RX 255 OP 636: Performed by: STUDENT IN AN ORGANIZED HEALTH CARE EDUCATION/TRAINING PROGRAM

## 2023-10-21 PROCEDURE — 99233 SBSQ HOSP IP/OBS HIGH 50: CPT | Performed by: INTERNAL MEDICINE

## 2023-10-21 PROCEDURE — A9585 GADOBUTROL INJECTION: HCPCS | Performed by: STUDENT IN AN ORGANIZED HEALTH CARE EDUCATION/TRAINING PROGRAM

## 2023-10-21 PROCEDURE — 250N000013 HC RX MED GY IP 250 OP 250 PS 637: Performed by: INTERNAL MEDICINE

## 2023-10-21 PROCEDURE — 99232 SBSQ HOSP IP/OBS MODERATE 35: CPT | Performed by: PODIATRIST

## 2023-10-21 PROCEDURE — 250N000011 HC RX IP 250 OP 636: Performed by: STUDENT IN AN ORGANIZED HEALTH CARE EDUCATION/TRAINING PROGRAM

## 2023-10-21 PROCEDURE — 120N000001 HC R&B MED SURG/OB

## 2023-10-21 PROCEDURE — 99222 1ST HOSP IP/OBS MODERATE 55: CPT | Mod: GC | Performed by: SURGERY

## 2023-10-21 PROCEDURE — 73720 MRI LWR EXTREMITY W/O&W/DYE: CPT | Mod: LT,MG

## 2023-10-21 RX ORDER — CILOSTAZOL 50 MG/1
50 TABLET ORAL 2 TIMES DAILY
Status: DISCONTINUED | OUTPATIENT
Start: 2023-10-21 | End: 2023-10-24 | Stop reason: HOSPADM

## 2023-10-21 RX ORDER — AMOXICILLIN 250 MG
2 CAPSULE ORAL 2 TIMES DAILY PRN
Status: DISCONTINUED | OUTPATIENT
Start: 2023-10-21 | End: 2023-10-24 | Stop reason: HOSPADM

## 2023-10-21 RX ORDER — GADOBUTROL 604.72 MG/ML
9 INJECTION INTRAVENOUS ONCE
Status: COMPLETED | OUTPATIENT
Start: 2023-10-21 | End: 2023-10-21

## 2023-10-21 RX ADMIN — CEFEPIME HYDROCHLORIDE 2 G: 2 INJECTION, POWDER, FOR SOLUTION INTRAVENOUS at 23:18

## 2023-10-21 RX ADMIN — AMLODIPINE BESYLATE 10 MG: 10 TABLET ORAL at 08:26

## 2023-10-21 RX ADMIN — HYDROCODONE BITARTRATE AND ACETAMINOPHEN 1 TABLET: 5; 325 TABLET ORAL at 12:33

## 2023-10-21 RX ADMIN — VANCOMYCIN HYDROCHLORIDE 1500 MG: 10 INJECTION, POWDER, LYOPHILIZED, FOR SOLUTION INTRAVENOUS at 06:06

## 2023-10-21 RX ADMIN — GADOBUTROL 9 ML: 604.72 INJECTION INTRAVENOUS at 18:26

## 2023-10-21 RX ADMIN — ATORVASTATIN CALCIUM 80 MG: 40 TABLET, FILM COATED ORAL at 20:26

## 2023-10-21 RX ADMIN — SERTRALINE HYDROCHLORIDE 100 MG: 100 TABLET ORAL at 08:26

## 2023-10-21 RX ADMIN — CILOSTAZOL 50 MG: 50 TABLET ORAL at 14:38

## 2023-10-21 RX ADMIN — POLYETHYLENE GLYCOL 3350 17 G: 17 POWDER, FOR SOLUTION ORAL at 06:07

## 2023-10-21 RX ADMIN — ACYCLOVIR 400 MG: 400 TABLET ORAL at 08:26

## 2023-10-21 RX ADMIN — CEFEPIME HYDROCHLORIDE 2 G: 2 INJECTION, POWDER, FOR SOLUTION INTRAVENOUS at 00:06

## 2023-10-21 RX ADMIN — NICOTINE 1 PATCH: 21 PATCH, EXTENDED RELEASE TRANSDERMAL at 20:30

## 2023-10-21 RX ADMIN — SENNOSIDES AND DOCUSATE SODIUM 2 TABLET: 50; 8.6 TABLET ORAL at 22:22

## 2023-10-21 RX ADMIN — ASPIRIN 81 MG: 81 TABLET, COATED ORAL at 08:26

## 2023-10-21 RX ADMIN — CEFEPIME HYDROCHLORIDE 2 G: 2 INJECTION, POWDER, FOR SOLUTION INTRAVENOUS at 08:37

## 2023-10-21 RX ADMIN — CEFEPIME HYDROCHLORIDE 2 G: 2 INJECTION, POWDER, FOR SOLUTION INTRAVENOUS at 16:09

## 2023-10-21 RX ADMIN — NICOTINE 1 PATCH: 21 PATCH, EXTENDED RELEASE TRANSDERMAL at 08:27

## 2023-10-21 RX ADMIN — PANTOPRAZOLE SODIUM 40 MG: 40 TABLET, DELAYED RELEASE ORAL at 08:27

## 2023-10-21 RX ADMIN — HYDROCODONE BITARTRATE AND ACETAMINOPHEN 1 TABLET: 5; 325 TABLET ORAL at 20:26

## 2023-10-21 RX ADMIN — CILOSTAZOL 50 MG: 50 TABLET ORAL at 20:26

## 2023-10-21 ASSESSMENT — ACTIVITIES OF DAILY LIVING (ADL)
ADLS_ACUITY_SCORE: 35

## 2023-10-21 NOTE — PLAN OF CARE
[id number]:    Orientation: A&Ox4  Activity: independent  Diet/BS Checks: Regular diet  Tele:  n/a  IV Access/Drains: PIV SL; intermittent antibiotics  Pain Management: denies pain  Abnormal VS/Results: VSS except HTN; foot US done on previous shift (see results)  Bowel/Bladder: Continent of bowel/bladder; complained of constipation, gave PRN Miralax this AM  Skin/Wounds: L great toe redness/black toenail  Consults: vascular, podiatry  D/C Disposition: TBD; unknown at this time

## 2023-10-21 NOTE — PROGRESS NOTES
[facility]      ASSESSMENT:  63-year-old female with past medical history significant for ovarian cancer, hypertension, hyperlipidemia, depression who presented to the emergency department on 10/20/2023 due to progressive pain in the left great toe.    She reports having an infected ingrown toenail removed approximately 1 week ago by her primary care physician.  Skin changes and progressive pain.  Please see podiatry consultation note by Dr. Royal for full details and other history.    Clinical exam and history of smoking, raise concern for peripheral arterial disease.  Noninvasive vascular studies were done and she was evaluated by vascular surgery.  There is some left infrapopliteal disease yet the left toe pressure is 93 mmHg.  It is thought she should have adequate circulation for healing at the toe level and no additional vascular work-up or intervention planned.    MEDICAL DECISION MAKING:  It is hard to know if the dry, hyperpigmented tissue around the nailbed of the left hallux is simply from the wound drying out versus eschar development due to microvascular disease.    The skin appears taut with some lighter discoloration along the medial skin fold.  This might suggest a superficial abscess.    Although x-ray is negative for any lytic or erosive changes, early osteomyelitis is not ruled out.      MRI, left foot, to evaluate for early osteomyelitis and/or abscess.  If MRI is concerning for osteomyelitis, given her pain and the appearance of the toe, partial left hallux amputation is a reasonable option.  She understands this.    MRI will also help determine if incision and drainage and/or excisional debridement is indicated.    It might also be reasonable to take a wait and watch approach and see how the soft tissue to demarcate, yet this can be lengthy.    She also considers the amputation option as a way to expedite recovery.    Podiatry will follow up tomorrow and review MRI  "results to help determine the more definitive care plan.    Juan Dior DPM, FACFAS, MS  M [facility] Department of Podiatry/Foot & Ankle Surgery  [phone]      _______________________________________________________________________      SUBJECTIVE:  No complaints other than left great toe pain when it's contacted.     EXAM:    B/P: 160/98, T: 98.1, P: 71, R: 16    Vascular: pedal pulses not readily palpable, left foot    Neurological: light touch sensation is intact    Musculoskeletal: no gross deformities. Let 1st MTPJ fusion     Dermatological: Erythema of the distal left hallux.  The nail bed, more medially, is hyperpigmented and dry.  Skin appears taut medially, possibly suggesting a superficial abscess.  This area is painful to the touch.    FOOT LEFT THREE OR MORE VIEWS   DATE/TIME: 10/20/2023 11:33 AM      INDICATION: Left foot infection.   COMPARISON: None.                                                                      IMPRESSION:  1.  No fracture, focal bone erosion, or joint malalignment.  2.  Instrumented arthrodesis of the first MTP joint with osseous  fusion. The hardware is intact.  3.  Achilles calcaneal spur.        COCO MCCOLLUM MD     Labs:     No results found for: \"INR\"  No components found for: \"ESR\"  @BREIFLAB(crp)@    Lab Results   Component Value Date    WBC 7.2 10/20/2023     Lab Results   Component Value Date    RBC 4.05 10/20/2023     Lab Results   Component Value Date    HGB 13.4 10/20/2023     Lab Results   Component Value Date    HCT 40.1 10/20/2023     No components found for: \"MCT\"  Lab Results   Component Value Date    MCV 99 10/20/2023     Lab Results   Component Value Date    MCH 33.1 10/20/2023     Lab Results   Component Value Date    MCHC 33.4 10/20/2023     Lab Results   Component Value Date    RDW 14.7 10/20/2023     Lab Results   Component Value Date     10/20/2023       All cultures:  No results for input(s): \"CULT\" in the last 168 " hours.    Hemoglobin   Date Value Ref Range Status   10/20/2023 13.4 11.7 - 15.7 g/dL Final

## 2023-10-21 NOTE — CONSULTS
Vascular Surgery Consultation    Pili Cuevas MRN# [id number]   Age: 63 year old YOB: 1960     Date of Admission:  10/20/2023    Date of Consult:   10/20/23    Reason for consult: Left great toe wound               Assessment and Plan:   63 year-old female bilateral short-distance calf claudication admitted with left great toe wound following nail removal last week. While she does have infrapopliteal stenosis on the left leg, NILAM of 0.73 and a toe pressure of 93 mmHg are indicative of adequate perfusion for wound healing. Recommend podiatry proceed with debridement or amputation as necessary.    She does have occlusive disease of the distal SFA on the RIGHT and lifestyle-limiting bilateral calf claudication. She is not medically optimized;  Aspirin and high-dose statin are ordered, as well as pletal. She understands the critical importance of tobacco cessation.   She is interested in supervised exercise therapy to address her claudication.    She is moving to [place], [place] and transferring all medical care to [facility] where she intends to follow with their vascular surgery team.    Vascular surgery will sign off. Please call with questions.      Discussed with staff, Dr. Juares.    Froilan Olivares MD           Chief Complaint:   Left great toe wound         History of Present Illness:   Ms. Pili Cuevas is a 63 year old female admitted with left great toe wound. An ingrown toenail was removed a week ago by PCP. She subsequently developed a wound. She does not know if this developed drainage but it became more painful. Denies fevers, chills, dyspnea, or chest pain. Was seen at [facility] two days ago where noninvasive arterial studies were reportedly done but we do not have access to these records. She notes bilateral calf claudication at one block for the last 6 months. This significantly inhibits her day to day function, unable to garden, walk to get mail etc. In face she used to be a   and has been unable to return to work since her original cancer diagnosis in part due to her short distance claudication.   She is an active smoker and has undergone total abdominal hysterectomy and chemotherapy for metastatic ovarian cancer and was recently diagnosed with recurrence. She tells me she has mets to her liver and spleen. She plans to be seen at the [facility] in the near future to discuss treatment options. She does not take aspirin or statin at home. She does not have diabetes or chronic kidney disease.              Past Medical History:     Past Medical History:   Diagnosis Date    Depression     History of ovarian cancer     S/p surgery and chemo 2021, now with reoccurance 2023    HLD (hyperlipidemia)     HTN (hypertension)              Past Surgical History:     Past Surgical History:   Procedure Laterality Date    C6-7 fusion      HYSTERECTOMY TOTAL ABDOMINAL  2021    Instrumented arthrodesis of the first MTP joint with osseous fusion               Social History:     Social History     Tobacco Use    Smoking status: Every Day     Packs/day: 1     Types: Cigarettes    Smokeless tobacco: Not on file   Substance Use Topics    Alcohol use: Not on file             Family History:   No family history on file.             Allergies:     Allergies   Allergen Reactions    Epinephrine     Trazodone Dizziness             Medications:     Current Facility-Administered Medications   Medication    acetaminophen (TYLENOL) tablet 650 mg    acyclovir (ZOVIRAX) tablet 400 mg    amLODIPine (NORVASC) tablet 10 mg    aspirin EC tablet 81 mg    atorvastatin (LIPITOR) tablet 80 mg    ceFEPIme (MAXIPIME) 2 g vial to attach to  mL bag for ADULTS or 50 mL bag for PEDS    HYDROcodone-acetaminophen (NORCO) 5-325 MG per tablet 1 tablet    melatonin tablet 1 mg    nicotine (NICODERM CQ) 21 MG/24HR 24 hr patch 1 patch    nicotine Patch in Place    ondansetron (ZOFRAN ODT) ODT tab 4 mg    Or    ondansetron (ZOFRAN)  "injection 4 mg    pantoprazole (PROTONIX) EC tablet 40 mg    polyethylene glycol (MIRALAX) Packet 17 g    sertraline (ZOLOFT) tablet 100 mg    vancomycin (VANCOCIN) 1,500 mg in 0.9% NaCl 250 mL intermittent infusion               Review of Systems:   A 12 point review of systems was completed and found to be negative unless otherwise stated in the above HPI          Physical Exam:   BP (!) 153/84 (BP Location: Right arm, Patient Position: Semi-Quiroz's, Cuff Size: Adult Regular)   Pulse 67   Temp 99.3  F (37.4  C) (Oral)   Resp 16   Ht 1.707 m (5' 7.2\")   Wt 90.7 kg (200 lb)   SpO2 95%   BMI 31.14 kg/m      No intake or output data in the 24 hours ending 10/20/23 1931    GEN: A&Ox3, no acute distress  NEURO: CN II-XII grossly intact. No gross neurologic deficits  NECK: trachea midline, no JVD  HEENT: No scleral icterus.  RESP: Nonlabored breathing on room air, no cough  CV: RRR by radial pulse, noncyanotic   ABD: soft, non-tender, nondistended. No guarding or rebound tenderness  MSK: Moves all extremities independently. Normal active range of motion.  PSYCH: cooperative   PULSES: Palpable femoral pulses. Nonpalpable poplitea and pedal pulses bilaterally. Unable to complete pulse exam with doppler evaluation as there is not a functional doppler device available on the patient's hospital floor.          Data:   All laboratory data reviewed    Results:  BMP  Recent Labs   Lab 10/20/23  1101      POTASSIUM 3.7   CHLORIDE 102   CO2 27   BUN 10.0   CR 0.46*   *     CBC  Recent Labs   Lab 10/20/23  1101   WBC 7.2   HGB 13.4   *     LFT  Recent Labs   Lab 10/20/23  1101   AST 21   ALT 18   ALKPHOS 92   BILITOTAL 0.3   ALBUMIN 4.1     Recent Labs   Lab 10/20/23  1101   *       Imaging:  Arterial duplex ultrasound demonstrates right SFA occlusion and left infrapopliteal stenosis    ABIs are 0.67 and 0.73 on the right and left, respectively  Toe pressure is 70 on the right and 93 on the " left.       Vascular surgery staff note: I seen and examined the patient myself.  I reviewed the noninvasive imaging.  Patient is a pleasant quite unfortunate 63-year-old female with stage IV ovarian cancer.  She had developed infection of the left great toe.  She does have a history of peripheral artery disease.  She has been perfusion in the left great toe that if her toe amputation is contemplated it would be nicely.  This was explained and discussed with the patient.    TONE CONRAD M.D.

## 2023-10-21 NOTE — PROGRESS NOTES
[facility]    Medicine Progress Note - Hospitalist Service    Date of Admission:  10/20/2023    Assessment & Plan   Pili Cuevas is a 63 year old female with a past medical history significant for ovarian cancer s/p surgery and chemo 2021 with reoccurrence in 2023 not currently on treatment, HTN, HLD, oral herpes, depression who presented to the ED on 10/20/23 for left toe pain. Patient was admitted on 10/20/23 left big toe cellulitis.     Left Big Toe Cellulitis   ?Possible Ischemia of Left Big Toe  PAD   Had toenail removed on 10/13/23 with PCP, started to worsen over the weekend. Saw PCP on 10/16/23 who had Podiatry see her, who started Abx and referred to Vascular Surgery. Was diagnosed with PAD but no intervention to be done. Pain, swelling and redness has been getting worse despite outpt abx. Patient is afebrile, vitally stable, has no WBC but does have an elevated CRP. Xray not showing osteo at this time but concerning for ?necrotic area on toe. Hx of history left first MTP.      - CRP: 7.41, ESR: 8   - Blood cx pending       - Podiatry Consulted might need amputation if the Toe is getting worse     - Consulted Vascular Surgery   As per vascular Surgery Noninvasive vascular studies reviewed. Left infrapopliteal disease with left NILAM 0.73 and toe pressure 93 mmHg indicating adequate perfusion for wound healing.   Recommend podiatry proceed with debridement/amputation as necessary.     - Started on Cefepime and Vancomycin. Will discharge vancomycin now continue cefepime    - Continue home Hydrocodone 3-352mg Q6H PRN     Chronic Medical Problems:     HTN   - Continue home Amlodipine 10mg daily     GERD   - Continue home Omeprazole 20mg daily    Depression    - Continue home Zoloft 100mg daily     Oral Herpes   - Continue home Acyclovir 400mg daily     Tobacco Abuse  Smokes a pack of day   - Nicotine patch ordered     Ovarian Cancer   Initially diagnosed in March 2021. Underwent MEENU  "and chemo. Was in remission until a reoccurrence was found in September 2023. She is in the process of moving to [place] and has an appointment with [facility] for next steps at the end of the month.             Diet: Combination Diet Regular Diet Adult    DVT Prophylaxis: Ambulate every shift  Perez Catheter: Not present  Lines: None     Cardiac Monitoring: None  Code Status: Full Code      Clinically Significant Risk Factors Present on Admission                       # Obesity: Estimated body mass index is 31.14 kg/m  as calculated from the following:    Height as of this encounter: 1.707 m (5' 7.2\").    Weight as of this encounter: 90.7 kg (200 lb).              Disposition Plan     Expected Discharge Date: 10/21/2023                    Olya Hamilton MD  Hospitalist Service  [facility]  Securely message with [employer] (more info)  Text page via [facility] Paging/Directory   ______________________________________________________________________    Interval History     Patient is resting comfortably in bed.  Complains of left toe pain intermittently.  Vascular surgery evaluated her and thought there is adequate perfusion for wound healing.  They recommended podiatric procedure with debridement/amputation.  She denies any chest pain or shortness of breath no other acute events in the last 24 hours.   patient is new to me today.  Chart reviewed    Physical Exam   Vital Signs: Temp: 98.1  F (36.7  C) Temp src: Oral BP: (!) 160/98 Pulse: 71   Resp: 16 SpO2: 94 % O2 Device: None (Room air)    Weight: 200 lbs 0 oz    General Appearance: Alert awake, not in acute distress pleasant female sitting comfortably in bed  Respiratory: Clear to auscultation bilaterally  Cardiovascular: Normal rate rhythm regular  GI: Soft, nontender nondistended bowel sounds positive  Skin: Left great toe rednes with some area of ischemia with black eschar noted  Other:      Medical Decision Making "       52 MINUTES SPENT BY ME on the date of service doing chart review, history, exam, documentation & further activities per the note.      Data     I have personally reviewed the following data over the past 24 hrs:    7.2  \   13.4   / 145 (L)     137 102 10.0 /  122 (H)   3.7 27 0.46 (L) \     ALT: 18 AST: 21 AP: 92 TBILI: 0.3   ALB: 4.1 TOT PROTEIN: 6.5 LIPASE: N/A     TSH: N/A T4: N/A A1C: 5.6     Procal: N/A CRP: 7.41 (H) Lactic Acid: N/A         Imaging results reviewed over the past 24 hrs:   Recent Results (from the past 24 hour(s))   Foot XR, G/E 3 views, left    Narrative    FOOT LEFT THREE OR MORE VIEWS   DATE/TIME: 10/20/2023 11:33 AM     INDICATION: Left foot infection.   COMPARISON: None.      Impression    IMPRESSION:  1.  No fracture, focal bone erosion, or joint malalignment.  2.  Instrumented arthrodesis of the first MTP joint with osseous  fusion. The hardware is intact.  3.  Achilles calcaneal spur.       COCO MCCOLLUM MD         SYSTEM ID:  [id number]   US NILAM Doppler No Exercise    Narrative    EXAM: RESTING ANKLE-BRACHIAL INDICES (ABIs)  LOCATION: [facility]  DATE: 10/20/2023    INDICATION: Left toe wound, decreased lower extremity pulses, lower extremity pain.  COMPARISON: None.    NILAM FINDINGS:  RIGHT  Brachial: 164  Ankle (PT): Occluded Index: NC  Ankle (DP): 110 Index: 0.67    LEFT  Brachial: 164  Ankle (PT): 116 Index: 0.71  Ankle (DP): 119 Index: 0.73    The right NILAM at rest is 0.67. The left NILAM at rest is 0.73.      WAVEFORMS: The dorsalis pedis and posterior tibial arteries are monophasic.      Impression    IMPRESSION:  1.  RIGHT LOWER EXTREMITY: Resting ankle-brachial index of 0.67 reflecting moderate peripheral arterial disease. Digit pressure of 70 mm Hg suggests adequate wound healing potential.  2.  LEFT LOWER EXTREMITY: Resting ankle-brachial index of 0.73 reflecting moderate peripheral arterial disease. Digit pressure of 93 mm Hg suggests adequate  wound healing potential.   US Lower Extremity Arterial Duplex Bilateral    Narrative    EXAM: US LOWER EXTREMITY ARTERIAL DUPLEX BILATERAL  LOCATION: [facility]  DATE: 10/20/2023    INDICATION: Left toe wounds, worsening pain, swelling and redness  COMPARISON: None.  TECHNIQUE: Duplex utilizing 2D gray-scale imaging, Doppler interrogation with color-flow and spectral waveform analysis.    FINDINGS:     Right - Normal multiphasic waveforms and velocities in the common femoral and superficial femoral arteries with occlusion at the level of the distal superficial femoral artery. There is reconstitution at the level of the distal popliteal artery with   blunted, monophasic waveforms below this level. Unable to visualize right peroneal artery, could be chronically occluded.    Left- Normal multiphasic waveforms and velocities in the common femoral artery through the mid popliteal artery. Scattered calcified atherosclerosis noted. Blunted, monophasic waveforms seen in the distal popliteal and calf arteries, although no distinct   stenotic area is visualized.    RIGHT LOWER EXTREMITY ARTERIAL ASSESSMENT:    Common femoral artery: 63 cm/s  Profunda femoris artery: 73 cm/s  SFA (proximal): 58 cm/s  SFA (mid): 40 cm/s  SFA (distal): Occluded  Popliteal artery (proximal): Occluded  Popliteal artery (distal): 21 cm/s  Anterior tibial artery: 45 cm/s  Posterior tibial artery: 7 cm/s  Peroneal artery: Not visualized.  Dorsalis pedis artery: 52 cm/s    LEFT LOWER EXTREMITY ARTERIAL ASSESSMENT:    Common femoral artery: 110 cm/s  Profunda femoris artery: 108 cm/s  SFA (proximal): 111 cm/s  SFA (mid): 90 cm/s  SFA (distal): 133 cm/s  Popliteal artery (proximal): 75 cm/s  Popliteal artery (distal): 39 cm/s  Anterior tibial artery: 113 cm/s  Posterior tibial artery: 48 cm/s  Peroneal artery: 41 cm/s  Dorsalis pedis artery: 44 cm/s        Impression    IMPRESSION:  1.  Occlusion of the right distal superficial  femoral artery with reconstitution the level of the distal popliteal artery. Blunted, monophasic waveforms below this point.  2.  Blunted, monophasic waveforms below the knee in the left lower extremity without focal stenosis or occlusion visualized.

## 2023-10-21 NOTE — PLAN OF CARE
Goal Outcome Evaluation:    DATE AND TIME: 10/21/23  [id number]     Orientation: A&Ox4  Activity: Independent  Diet/BS Checks: Regular diet  Tele:  n/a  IV Access/Drains: PIV SL; intermittent antibiotics  Pain Management: Rated pain at 3/10; received PRN Norco-effective  Abnormal VS/Results: VSS except HTN; foot US done yesterday; To have MR of Left Foot this evening to determine I&D vs. Amputation of left great toe.  CRP-7.41, LDL-118.  Bowel/Bladder: Continent of bowel/bladder; received PRN Miralax on previous shift; had formed BM x1 this shift.  Skin/Wounds: L great toe with tenderness, swelling, warmth and reddened/black nailbed  Consults: vascular, podiatry  D/C Disposition: TBD; unknown at this time

## 2023-10-21 NOTE — PLAN OF CARE
Goal Outcome Evaluation:  Summary:  ad for left toe pain.   Hx of  ovarian cancer s/p surgery and chemo 2021 with reoccurrence in 2023 not currently on treatment, HTN, HLD, oral herpes, depression    Primary Diagnosis: Left Big Toe Cellulitis   Orientation: A&Ox4.  Up  Aggression Stop Light: green  Mobility: ind  Pain Management: prn tylenol given 1  Diet: reg  Bowel/Bladder: continent   Abnormal Lab/Assessments: see results. Seen by Vascular Surgery this shift.  Went down for Ultrasound-LLE. On IV antibiotics  Drain/Device/Wound: PIV SL with intermittent antibiotics   Consults: vascular following   D/C Day/Goals/Place: TBD    Shift Note: Left big toe reddened with tenderness, swelling and warmth.  Able to bear weight on LLE.

## 2023-10-21 NOTE — PROGRESS NOTES
Noninvasive vascular studies reviewed. Left infrapopliteal disease with left NILAM 0.73 and toe pressure 93 mmHg indicating adequate perfusion for wound healing.   Recommend podiatry proceed with debridement/amputation as necessary.    Froilan Olivares MD

## 2023-10-22 PROCEDURE — 250N000013 HC RX MED GY IP 250 OP 250 PS 637: Performed by: INTERNAL MEDICINE

## 2023-10-22 PROCEDURE — 250N000013 HC RX MED GY IP 250 OP 250 PS 637: Performed by: STUDENT IN AN ORGANIZED HEALTH CARE EDUCATION/TRAINING PROGRAM

## 2023-10-22 PROCEDURE — 250N000011 HC RX IP 250 OP 636: Performed by: STUDENT IN AN ORGANIZED HEALTH CARE EDUCATION/TRAINING PROGRAM

## 2023-10-22 PROCEDURE — 99232 SBSQ HOSP IP/OBS MODERATE 35: CPT | Mod: 57 | Performed by: PODIATRIST

## 2023-10-22 PROCEDURE — 120N000001 HC R&B MED SURG/OB

## 2023-10-22 PROCEDURE — 99232 SBSQ HOSP IP/OBS MODERATE 35: CPT | Performed by: INTERNAL MEDICINE

## 2023-10-22 RX ADMIN — ACYCLOVIR 400 MG: 400 TABLET ORAL at 08:49

## 2023-10-22 RX ADMIN — CEFEPIME HYDROCHLORIDE 2 G: 2 INJECTION, POWDER, FOR SOLUTION INTRAVENOUS at 06:54

## 2023-10-22 RX ADMIN — HYDROCODONE BITARTRATE AND ACETAMINOPHEN 1 TABLET: 5; 325 TABLET ORAL at 12:09

## 2023-10-22 RX ADMIN — POLYETHYLENE GLYCOL 3350 17 G: 17 POWDER, FOR SOLUTION ORAL at 08:57

## 2023-10-22 RX ADMIN — CILOSTAZOL 50 MG: 50 TABLET ORAL at 06:08

## 2023-10-22 RX ADMIN — CEFEPIME HYDROCHLORIDE 2 G: 2 INJECTION, POWDER, FOR SOLUTION INTRAVENOUS at 23:14

## 2023-10-22 RX ADMIN — SENNOSIDES AND DOCUSATE SODIUM 2 TABLET: 50; 8.6 TABLET ORAL at 20:20

## 2023-10-22 RX ADMIN — SERTRALINE HYDROCHLORIDE 100 MG: 100 TABLET ORAL at 08:49

## 2023-10-22 RX ADMIN — HYDROCODONE BITARTRATE AND ACETAMINOPHEN 1 TABLET: 5; 325 TABLET ORAL at 23:13

## 2023-10-22 RX ADMIN — CEFEPIME HYDROCHLORIDE 2 G: 2 INJECTION, POWDER, FOR SOLUTION INTRAVENOUS at 14:58

## 2023-10-22 RX ADMIN — AMLODIPINE BESYLATE 10 MG: 10 TABLET ORAL at 08:49

## 2023-10-22 RX ADMIN — HYDROCODONE BITARTRATE AND ACETAMINOPHEN 1 TABLET: 5; 325 TABLET ORAL at 17:22

## 2023-10-22 RX ADMIN — CILOSTAZOL 50 MG: 50 TABLET ORAL at 20:20

## 2023-10-22 RX ADMIN — PANTOPRAZOLE SODIUM 40 MG: 40 TABLET, DELAYED RELEASE ORAL at 08:49

## 2023-10-22 RX ADMIN — NICOTINE 1 PATCH: 21 PATCH, EXTENDED RELEASE TRANSDERMAL at 20:27

## 2023-10-22 RX ADMIN — HYDROCODONE BITARTRATE AND ACETAMINOPHEN 1 TABLET: 5; 325 TABLET ORAL at 06:08

## 2023-10-22 RX ADMIN — ATORVASTATIN CALCIUM 80 MG: 40 TABLET, FILM COATED ORAL at 20:20

## 2023-10-22 RX ADMIN — ASPIRIN 81 MG: 81 TABLET, COATED ORAL at 08:49

## 2023-10-22 ASSESSMENT — ACTIVITIES OF DAILY LIVING (ADL)
ADLS_ACUITY_SCORE: 18

## 2023-10-22 NOTE — PROGRESS NOTES
[facility]    Medicine Progress Note - Hospitalist Service    Date of Admission:  10/20/2023    Assessment & Plan   Pili Cuevas is a 63 year old female with a past medical history significant for ovarian cancer s/p surgery and chemo 2021 with reoccurrence in 2023 not currently on treatment, HTN, HLD, oral herpes, depression who presented to the ED on 10/20/23 for left toe pain. Patient was admitted on 10/20/23 left big toe cellulitis.     Left Big Toe Cellulitis   PAD   Had toenail removed on 10/13/23 with PCP, started to worsen over the weekend. Saw PCP on 10/16/23 who had Podiatry see her, who started Abx and referred to Vascular Surgery. Was diagnosed with PAD but no intervention to be done. Pain, swelling and redness has been getting worse despite outpt abx. Patient is afebrile, vitally stable, has no WBC but does have an elevated CRP. Xray not showing osteo at this time but concerning for ?necrotic area on toe. Hx of history left first MTP.      - CRP: 7.41, ESR: 8   - Blood cx pending       - Podiatry Consulted might need amputation if the Toe is getting worse     - Consulted Vascular Surgery   As per vascular Surgery Noninvasive vascular studies reviewed. Left infrapopliteal disease with left NILAM 0.73 and toe pressure 93 mmHg indicating adequate perfusion for wound healing.   Recommend podiatry proceed with debridement/amputation as necessary  Started on Cilostazole 50mg PO BID and Statin dose increased to lipitor 80mg po Daily   Left Foot MRI done and Podiatry following .     - Started on Cefepime and Vancomycin. Will discharge vancomycin now continue cefepime    - Continue home Hydrocodone 3-352mg Q6H PRN     Chronic Medical Problems:     HTN   - Continue home Amlodipine 10mg daily     GERD   - Continue home Omeprazole 20mg daily    Depression    - Continue home Zoloft 100mg daily     Oral Herpes   - Continue home Acyclovir 400mg daily     Tobacco Abuse  Smokes a pack of  "day   - Nicotine patch ordered     Ovarian Cancer   Initially diagnosed in March 2021. Underwent MEENU and chemo. Was in remission until a reoccurrence was found in September 2023. She is in the process of moving to [place] and has an appointment with [facility] for next steps at the end of the month.       Constipation:  On bowel regimen             Diet: Combination Diet Regular Diet Adult    DVT Prophylaxis: Ambulate every shift  Perez Catheter: Not present  Lines: None     Cardiac Monitoring: None  Code Status: Full Code      Clinically Significant Risk Factors                         # Obesity: Estimated body mass index is 31.14 kg/m  as calculated from the following:    Height as of this encounter: 1.707 m (5' 7.2\").    Weight as of this encounter: 90.7 kg (200 lb)., PRESENT ON ADMISSION            Disposition Plan     Expected Discharge Date: 10/23/2023                    Olya Hamilton MD  Hospitalist Service  [facility]  Securely message with [employer] (more info)  Text page via [employer] ??? Paging/Directory   ______________________________________________________________________    Interval History     Patient is resting comfortably in bed.  Complains of left toe pain intermittently.    She denies any chest pain or shortness of breath no other acute events in the last 24 hours.       Physical Exam   Vital Signs: Temp: 97.7  F (36.5  C) Temp src: Oral BP: (!) 140/81 Pulse: 71   Resp: 18 SpO2: 93 % O2 Device: None (Room air)    Weight: 200 lbs 0 oz    General Appearance: Alert awake, not in acute distress pleasant female sitting comfortably in bed  Respiratory: Clear to auscultation bilaterally  Cardiovascular: Normal rate rhythm regular  GI: Soft, nontender nondistended bowel sounds positive  Skin: Left great toe redness is improving  with some area of ischemia with black eschar noted  Other:      Medical Decision Making       52 MINUTES SPENT BY ME on the date of " service doing chart review, history, exam, documentation & further activities per the note.      Data         Imaging results reviewed over the past 24 hrs:   Recent Results (from the past 24 hour(s))   MR Foot Left w/o & w Contrast    Narrative    EXAM: MR FOOT LEFT W/O and W CONTRAST  LOCATION: [facility]  DATE: 10/21/2023    INDICATION: Evaluate for early osteomyelitis and abscess, left great toe  COMPARISON: 10/20/2023.  TECHNIQUE: Routine. Additional postgadolinium T1 sequences were obtained.  IV CONTRAST: 9mL Gadavist    FINDINGS:     Artifact from the first MTP arthrodesis. Artifact limits assessment surrounding the bone/metallic interface. The extensor tendon to the first toe is not well evaluated. Distal to the hardware, there is no evidence of osteomyelitis of the great toe distal   phalanx. There is minimal bone marrow edema along the distal tuft of the great toe without marrow replacement. There is no large or well-defined abscess/fluid collection.    Remaining bones of the forefoot show mild scattered arthritic changes. No evidence of an acute fracture.    No tenosynovitis. Apart from the extensor tendon to the toe which is not well assessed, the remaining tendons show no acute abnormality or tenosynovitis. Mild fatty atrophy intrinsic muscles of the foot.        Impression    IMPRESSION:  1.  Postoperative changes first MTP arthrodesis. Artifact from hardware limits local assessment of the bone/metallic interface and early signs of osteomyelitis. No discrete or large soft tissue abscess.   2.  Minimal bone marrow edema distal phalanx/tuft of the great toe felt to be reactive. No definitive evidence of osteomyelitis at this time.  3.  Extensor tendon to the great toe is not well evaluated at the level of the hardware. Otherwise tendons of the forefoot show no acute abnormality or tenosynovitis.  4.  Mild scattered arthritic changes the forefoot.  5.  Mild fatty atrophy of the  intrinsic muscles of the foot.

## 2023-10-22 NOTE — PLAN OF CARE
Goal Outcome Evaluation:    Patient up IND in room. Adequate I/O. Pain managed with PO meds. Plan to be NPO at midnight for procedure tomorrow.

## 2023-10-22 NOTE — PLAN OF CARE
Goal Outcome Evaluation:         Orientation: A&OX4  Activity: Independent  Diet/BS Checks: Regular  Tele: n/a  IV Access/Drains: PIV SL  Pain Management: Norco x2  Abnormal VS/Results:   Bowel/Bladder: Continent, mild constipation - PRN senna given  Skin/Wounds: black wound to L great toe, redness to surrounding tissue  Consults: Podiatry, surgery  D/C Disposition: Pending treatment plan (debridement vs amputation)  Other Info: MRI of L foot completed at start of shift - see chart for results.

## 2023-10-22 NOTE — PLAN OF CARE
Goal Outcome Evaluation:    Orientation: A&OX4  Activity: Independent  Diet/BS Checks: Regular  Tele: n/a  IV Access/Drains: PIV SL w/ int abx  Pain Management: Norco x1  Abnormal VS/Results:   Bowel/Bladder: Continent, Last BM 10/22  Skin/Wounds: black wound to L great toe, redness to surrounding tissue  Consults: Podiatry, surgery  D/C Disposition: Pending debridement, likely home in 1-2 days  Other Info:   Acute increase in pain after shower, prn norco given. Npo at midnight for debridement 10/23.

## 2023-10-22 NOTE — PROGRESS NOTES
[facility]      ASSESSMENT:  63-year-old female with past medical history significant for ovarian cancer, hypertension, hyperlipidemia, depression who presented to the emergency department on 10/20/2023 due to progressive pain in the left great toe, after a toenail avulsion procedure 1 week prior.    XR and MRI negative for osteomyelitis.  No abscess seen on MRI.     Clinical exam and history of smoking, raise concern for peripheral arterial disease.  Noninvasive vascular studies were done and she was evaluated by vascular surgery.  There is some left infrapopliteal disease yet the left toe pressure is 93 mmHg.  It is thought she should have adequate circulation for healing at the toe level and no additional vascular work-up or intervention planned.    MEDICAL DECISION MAKING:  I reviewed the MRI results with Pili and her Sister Kiana.  No definitive evidence for osteomyelitis involving the left hallux.  Therefore, I do not think any level of toe amputation is in indicated at this time.    We discussed 2 options:  1) Take a wait and watch approach and see how the soft tissues demarcate which might involve progression of the current presentation or healing.  2) excisional debridement of the left hallux nail bed down to more viable tissue in the wound healing efforts thereafter.    This is somewhat complicated, as she will be starting chemotherapy and questions the impacted toe infection might have on this.    She and her sister both want to proceed with the excisional debridement option.    The case request is placed.  No definitive time yet.  N.p.o. postmidnight.    From a podiatry standpoint, probably fine to discharge on Tuesday morning.  She will need follow-up with podiatry and/or wound care at the [facility], when undergoing chemotherapy.        Juan Dior DPM, FACFAS, MS  [facility] Department of Podiatry/Foot & Ankle  Surgery  [phone]      _______________________________________________________________________      SUBJECTIVE: No complaints, other than some ongoing pain involving the left hallux.  Her sister Kiana, was on the speaker phone today.    EXAM:    B/P: 140/81, T: 97.7, P: 71, R: 18    Vascular: pedal pulses not readily palpable, left foot     Neurological: light touch sensation is intact     Musculoskeletal: no gross deformities. Let 1st MTPJ fusion      Dermatological: Erythema of the distal left hallux.  The nail bed, more medially, is hyperpigmented and dry.  Skin appears taut medially, possibly suggesting a superficial abscess.  This area is painful to the touch.     EXAM: MR FOOT LEFT W/O and W CONTRAST  LOCATION: [facility]  DATE: 10/21/2023     INDICATION: Evaluate for early osteomyelitis and abscess, left great toe  COMPARISON: 10/20/2023.  TECHNIQUE: Routine. Additional postgadolinium T1 sequences were obtained.  IV CONTRAST: 9mL Gadavist     FINDINGS:      Artifact from the first MTP arthrodesis. Artifact limits assessment surrounding the bone/metallic interface. The extensor tendon to the first toe is not well evaluated. Distal to the hardware, there is no evidence of osteomyelitis of the great toe distal   phalanx. There is minimal bone marrow edema along the distal tuft of the great toe without marrow replacement. There is no large or well-defined abscess/fluid collection.     Remaining bones of the forefoot show mild scattered arthritic changes. No evidence of an acute fracture.     No tenosynovitis. Apart from the extensor tendon to the toe which is not well assessed, the remaining tendons show no acute abnormality or tenosynovitis. Mild fatty atrophy intrinsic muscles of the foot.                                                              IMPRESSION:  1.  Postoperative changes first MTP arthrodesis. Artifact from hardware limits local assessment of the bone/metallic  "interface and early signs of osteomyelitis. No discrete or large soft tissue abscess.   2.  Minimal bone marrow edema distal phalanx/tuft of the great toe felt to be reactive. No definitive evidence of osteomyelitis at this time.  3.  Extensor tendon to the great toe is not well evaluated at the level of the hardware. Otherwise tendons of the forefoot show no acute abnormality or tenosynovitis.  4.  Mild scattered arthritic changes the forefoot.  5.  Mild fatty atrophy of the intrinsic muscles of the foot.    FOOT LEFT THREE OR MORE VIEWS   DATE/TIME: 10/20/2023 11:33 AM      INDICATION: Left foot infection.   COMPARISON: None.                                                                      IMPRESSION:  1.  No fracture, focal bone erosion, or joint malalignment.  2.  Instrumented arthrodesis of the first MTP joint with osseous  fusion. The hardware is intact.  3.  Achilles calcaneal spur.        COCO MCCOLLUM MD        Lab Results   Component Value Date    WBC 7.2 10/20/2023     Lab Results   Component Value Date    RBC 4.05 10/20/2023     Lab Results   Component Value Date    HGB 13.4 10/20/2023     Lab Results   Component Value Date    HCT 40.1 10/20/2023     No components found for: \"MCT\"  Lab Results   Component Value Date    MCV 99 10/20/2023     Lab Results   Component Value Date    MCH 33.1 10/20/2023     Lab Results   Component Value Date    MCHC 33.4 10/20/2023     Lab Results   Component Value Date    RDW 14.7 10/20/2023     Lab Results   Component Value Date     10/20/2023       All cultures:  No results for input(s): \"CULT\" in the last 168 hours.    Hemoglobin   Date Value Ref Range Status   10/20/2023 13.4 11.7 - 15.7 g/dL Final         "

## 2023-10-23 ENCOUNTER — ANESTHESIA EVENT (OUTPATIENT)
Dept: SURGERY | Facility: CLINIC | Age: 63
DRG: 857 | End: 2023-10-23
Payer: MEDICARE

## 2023-10-23 ENCOUNTER — ANESTHESIA (OUTPATIENT)
Dept: SURGERY | Facility: CLINIC | Age: 63
DRG: 857 | End: 2023-10-23
Payer: MEDICARE

## 2023-10-23 LAB
GRAM STAIN RESULT: NORMAL
GRAM STAIN RESULT: NORMAL

## 2023-10-23 PROCEDURE — 87075 CULTR BACTERIA EXCEPT BLOOD: CPT | Performed by: PODIATRIST

## 2023-10-23 PROCEDURE — 0QBR0ZZ EXCISION OF LEFT TOE PHALANX, OPEN APPROACH: ICD-10-PCS | Performed by: PODIATRIST

## 2023-10-23 PROCEDURE — 999N000141 HC STATISTIC PRE-PROCEDURE NURSING ASSESSMENT: Performed by: PODIATRIST

## 2023-10-23 PROCEDURE — 258N000003 HC RX IP 258 OP 636: Performed by: NURSE ANESTHETIST, CERTIFIED REGISTERED

## 2023-10-23 PROCEDURE — 370N000017 HC ANESTHESIA TECHNICAL FEE, PER MIN: Performed by: PODIATRIST

## 2023-10-23 PROCEDURE — 250N000013 HC RX MED GY IP 250 OP 250 PS 637: Performed by: STUDENT IN AN ORGANIZED HEALTH CARE EDUCATION/TRAINING PROGRAM

## 2023-10-23 PROCEDURE — 87070 CULTURE OTHR SPECIMN AEROBIC: CPT | Performed by: PODIATRIST

## 2023-10-23 PROCEDURE — 360N000078 HC SURGERY LEVEL 5, PER MIN: Performed by: PODIATRIST

## 2023-10-23 PROCEDURE — 87077 CULTURE AEROBIC IDENTIFY: CPT | Performed by: PODIATRIST

## 2023-10-23 PROCEDURE — 250N000013 HC RX MED GY IP 250 OP 250 PS 637: Performed by: PODIATRIST

## 2023-10-23 PROCEDURE — 258N000003 HC RX IP 258 OP 636: Performed by: ANESTHESIOLOGY

## 2023-10-23 PROCEDURE — 87205 SMEAR GRAM STAIN: CPT | Performed by: PODIATRIST

## 2023-10-23 PROCEDURE — 250N000011 HC RX IP 250 OP 636: Performed by: PODIATRIST

## 2023-10-23 PROCEDURE — 250N000011 HC RX IP 250 OP 636: Performed by: STUDENT IN AN ORGANIZED HEALTH CARE EDUCATION/TRAINING PROGRAM

## 2023-10-23 PROCEDURE — 250N000009 HC RX 250: Performed by: NURSE ANESTHETIST, CERTIFIED REGISTERED

## 2023-10-23 PROCEDURE — 710N000009 HC RECOVERY PHASE 1, LEVEL 1, PER MIN: Performed by: PODIATRIST

## 2023-10-23 PROCEDURE — 120N000001 HC R&B MED SURG/OB

## 2023-10-23 PROCEDURE — 250N000011 HC RX IP 250 OP 636: Performed by: NURSE ANESTHETIST, CERTIFIED REGISTERED

## 2023-10-23 PROCEDURE — 272N000001 HC OR GENERAL SUPPLY STERILE: Performed by: PODIATRIST

## 2023-10-23 PROCEDURE — 250N000009 HC RX 250: Performed by: PODIATRIST

## 2023-10-23 PROCEDURE — 99232 SBSQ HOSP IP/OBS MODERATE 35: CPT | Performed by: INTERNAL MEDICINE

## 2023-10-23 PROCEDURE — 250N000011 HC RX IP 250 OP 636: Mod: JZ | Performed by: STUDENT IN AN ORGANIZED HEALTH CARE EDUCATION/TRAINING PROGRAM

## 2023-10-23 RX ORDER — POLYETHYLENE GLYCOL 3350 17 G/17G
17 POWDER, FOR SOLUTION ORAL DAILY
Status: DISCONTINUED | OUTPATIENT
Start: 2023-10-24 | End: 2023-10-24 | Stop reason: HOSPADM

## 2023-10-23 RX ORDER — MAGNESIUM HYDROXIDE 1200 MG/15ML
LIQUID ORAL PRN
Status: DISCONTINUED | OUTPATIENT
Start: 2023-10-23 | End: 2023-10-23 | Stop reason: HOSPADM

## 2023-10-23 RX ORDER — BISACODYL 10 MG
10 SUPPOSITORY, RECTAL RECTAL DAILY PRN
Status: DISCONTINUED | OUTPATIENT
Start: 2023-10-23 | End: 2023-10-24 | Stop reason: HOSPADM

## 2023-10-23 RX ORDER — ONDANSETRON 4 MG/1
4 TABLET, ORALLY DISINTEGRATING ORAL EVERY 6 HOURS PRN
Status: DISCONTINUED | OUTPATIENT
Start: 2023-10-23 | End: 2023-10-23

## 2023-10-23 RX ORDER — ONDANSETRON 4 MG/1
4 TABLET, ORALLY DISINTEGRATING ORAL EVERY 30 MIN PRN
Status: DISCONTINUED | OUTPATIENT
Start: 2023-10-23 | End: 2023-10-23 | Stop reason: HOSPADM

## 2023-10-23 RX ORDER — NALOXONE HYDROCHLORIDE 0.4 MG/ML
0.2 INJECTION, SOLUTION INTRAMUSCULAR; INTRAVENOUS; SUBCUTANEOUS
Status: DISCONTINUED | OUTPATIENT
Start: 2023-10-23 | End: 2023-10-24 | Stop reason: HOSPADM

## 2023-10-23 RX ORDER — NALOXONE HYDROCHLORIDE 0.4 MG/ML
0.4 INJECTION, SOLUTION INTRAMUSCULAR; INTRAVENOUS; SUBCUTANEOUS
Status: DISCONTINUED | OUTPATIENT
Start: 2023-10-23 | End: 2023-10-24 | Stop reason: HOSPADM

## 2023-10-23 RX ORDER — SODIUM CHLORIDE, SODIUM LACTATE, POTASSIUM CHLORIDE, CALCIUM CHLORIDE 600; 310; 30; 20 MG/100ML; MG/100ML; MG/100ML; MG/100ML
INJECTION, SOLUTION INTRAVENOUS CONTINUOUS
Status: DISCONTINUED | OUTPATIENT
Start: 2023-10-23 | End: 2023-10-23 | Stop reason: HOSPADM

## 2023-10-23 RX ORDER — ONDANSETRON 2 MG/ML
4 INJECTION INTRAMUSCULAR; INTRAVENOUS EVERY 30 MIN PRN
Status: DISCONTINUED | OUTPATIENT
Start: 2023-10-23 | End: 2023-10-23 | Stop reason: HOSPADM

## 2023-10-23 RX ORDER — PROCHLORPERAZINE MALEATE 10 MG
10 TABLET ORAL EVERY 6 HOURS PRN
Status: DISCONTINUED | OUTPATIENT
Start: 2023-10-23 | End: 2023-10-24 | Stop reason: HOSPADM

## 2023-10-23 RX ORDER — FENTANYL CITRATE 50 UG/ML
INJECTION, SOLUTION INTRAMUSCULAR; INTRAVENOUS PRN
Status: DISCONTINUED | OUTPATIENT
Start: 2023-10-23 | End: 2023-10-23

## 2023-10-23 RX ORDER — ACETAMINOPHEN 325 MG/1
650 TABLET ORAL EVERY 4 HOURS PRN
Status: DISCONTINUED | OUTPATIENT
Start: 2023-10-26 | End: 2023-10-24 | Stop reason: HOSPADM

## 2023-10-23 RX ORDER — PROPOFOL 10 MG/ML
INJECTION, EMULSION INTRAVENOUS PRN
Status: DISCONTINUED | OUTPATIENT
Start: 2023-10-23 | End: 2023-10-23

## 2023-10-23 RX ORDER — LIDOCAINE 40 MG/G
CREAM TOPICAL
Status: DISCONTINUED | OUTPATIENT
Start: 2023-10-23 | End: 2023-10-24 | Stop reason: HOSPADM

## 2023-10-23 RX ORDER — FENTANYL CITRATE 0.05 MG/ML
25 INJECTION, SOLUTION INTRAMUSCULAR; INTRAVENOUS EVERY 5 MIN PRN
Status: DISCONTINUED | OUTPATIENT
Start: 2023-10-23 | End: 2023-10-23 | Stop reason: HOSPADM

## 2023-10-23 RX ORDER — HYDROMORPHONE HCL IN WATER/PF 6 MG/30 ML
0.2 PATIENT CONTROLLED ANALGESIA SYRINGE INTRAVENOUS EVERY 5 MIN PRN
Status: DISCONTINUED | OUTPATIENT
Start: 2023-10-23 | End: 2023-10-23 | Stop reason: HOSPADM

## 2023-10-23 RX ORDER — BUPIVACAINE HYDROCHLORIDE 5 MG/ML
INJECTION, SOLUTION EPIDURAL; INTRACAUDAL PRN
Status: DISCONTINUED | OUTPATIENT
Start: 2023-10-23 | End: 2023-10-23 | Stop reason: HOSPADM

## 2023-10-23 RX ORDER — DEXMEDETOMIDINE HYDROCHLORIDE 4 UG/ML
INJECTION, SOLUTION INTRAVENOUS PRN
Status: DISCONTINUED | OUTPATIENT
Start: 2023-10-23 | End: 2023-10-23

## 2023-10-23 RX ORDER — ONDANSETRON 2 MG/ML
4 INJECTION INTRAMUSCULAR; INTRAVENOUS EVERY 6 HOURS PRN
Status: DISCONTINUED | OUTPATIENT
Start: 2023-10-23 | End: 2023-10-23

## 2023-10-23 RX ORDER — FENTANYL CITRATE 0.05 MG/ML
50 INJECTION, SOLUTION INTRAMUSCULAR; INTRAVENOUS EVERY 5 MIN PRN
Status: DISCONTINUED | OUTPATIENT
Start: 2023-10-23 | End: 2023-10-23 | Stop reason: HOSPADM

## 2023-10-23 RX ORDER — ACETAMINOPHEN 325 MG/1
975 TABLET ORAL EVERY 8 HOURS
Qty: 27 TABLET | Refills: 0 | Status: DISCONTINUED | OUTPATIENT
Start: 2023-10-23 | End: 2023-10-24 | Stop reason: HOSPADM

## 2023-10-23 RX ORDER — AMOXICILLIN 250 MG
1 CAPSULE ORAL 2 TIMES DAILY
Status: DISCONTINUED | OUTPATIENT
Start: 2023-10-23 | End: 2023-10-24 | Stop reason: HOSPADM

## 2023-10-23 RX ORDER — HYDROMORPHONE HCL IN WATER/PF 6 MG/30 ML
0.4 PATIENT CONTROLLED ANALGESIA SYRINGE INTRAVENOUS EVERY 5 MIN PRN
Status: DISCONTINUED | OUTPATIENT
Start: 2023-10-23 | End: 2023-10-23 | Stop reason: HOSPADM

## 2023-10-23 RX ADMIN — SERTRALINE HYDROCHLORIDE 100 MG: 100 TABLET ORAL at 08:53

## 2023-10-23 RX ADMIN — FENTANYL CITRATE 100 MCG: 50 INJECTION INTRAMUSCULAR; INTRAVENOUS at 10:16

## 2023-10-23 RX ADMIN — PROPOFOL 150 MCG/KG/MIN: 10 INJECTION, EMULSION INTRAVENOUS at 10:13

## 2023-10-23 RX ADMIN — DEXMEDETOMIDINE HYDROCHLORIDE 12 MCG: 200 INJECTION INTRAVENOUS at 10:14

## 2023-10-23 RX ADMIN — CILOSTAZOL 50 MG: 50 TABLET ORAL at 20:11

## 2023-10-23 RX ADMIN — SODIUM CHLORIDE, POTASSIUM CHLORIDE, SODIUM LACTATE AND CALCIUM CHLORIDE: 600; 310; 30; 20 INJECTION, SOLUTION INTRAVENOUS at 10:24

## 2023-10-23 RX ADMIN — POLYETHYLENE GLYCOL 3350 17 G: 17 POWDER, FOR SOLUTION ORAL at 11:51

## 2023-10-23 RX ADMIN — CEFEPIME HYDROCHLORIDE 2 G: 2 INJECTION, POWDER, FOR SOLUTION INTRAVENOUS at 23:45

## 2023-10-23 RX ADMIN — PHENYLEPHRINE HYDROCHLORIDE 100 MCG: 10 INJECTION INTRAVENOUS at 10:29

## 2023-10-23 RX ADMIN — NICOTINE 1 PATCH: 21 PATCH, EXTENDED RELEASE TRANSDERMAL at 20:13

## 2023-10-23 RX ADMIN — ACYCLOVIR 400 MG: 400 TABLET ORAL at 08:53

## 2023-10-23 RX ADMIN — PANTOPRAZOLE SODIUM 40 MG: 40 TABLET, DELAYED RELEASE ORAL at 08:54

## 2023-10-23 RX ADMIN — CEFEPIME HYDROCHLORIDE 2 G: 2 INJECTION, POWDER, FOR SOLUTION INTRAVENOUS at 06:58

## 2023-10-23 RX ADMIN — PROPOFOL 30 MG: 10 INJECTION, EMULSION INTRAVENOUS at 10:15

## 2023-10-23 RX ADMIN — ATORVASTATIN CALCIUM 80 MG: 40 TABLET, FILM COATED ORAL at 20:12

## 2023-10-23 RX ADMIN — CILOSTAZOL 50 MG: 50 TABLET ORAL at 06:58

## 2023-10-23 RX ADMIN — AMLODIPINE BESYLATE 10 MG: 10 TABLET ORAL at 08:53

## 2023-10-23 RX ADMIN — PHENYLEPHRINE HYDROCHLORIDE 100 MCG: 10 INJECTION INTRAVENOUS at 10:35

## 2023-10-23 RX ADMIN — ONDANSETRON 4 MG: 2 INJECTION INTRAMUSCULAR; INTRAVENOUS at 10:26

## 2023-10-23 RX ADMIN — PROPOFOL 30 MG: 10 INJECTION, EMULSION INTRAVENOUS at 10:14

## 2023-10-23 RX ADMIN — CEFEPIME HYDROCHLORIDE 2 G: 2 INJECTION, POWDER, FOR SOLUTION INTRAVENOUS at 16:12

## 2023-10-23 RX ADMIN — ASPIRIN 81 MG: 81 TABLET, COATED ORAL at 11:51

## 2023-10-23 RX ADMIN — DEXMEDETOMIDINE HYDROCHLORIDE 8 MCG: 200 INJECTION INTRAVENOUS at 10:16

## 2023-10-23 RX ADMIN — HYDROCODONE BITARTRATE AND ACETAMINOPHEN 1 TABLET: 5; 325 TABLET ORAL at 08:53

## 2023-10-23 RX ADMIN — DOCUSATE SODIUM 50 MG AND SENNOSIDES 8.6 MG 1 TABLET: 8.6; 5 TABLET, FILM COATED ORAL at 20:11

## 2023-10-23 ASSESSMENT — LIFESTYLE VARIABLES: TOBACCO_USE: 1

## 2023-10-23 ASSESSMENT — ACTIVITIES OF DAILY LIVING (ADL)
ADLS_ACUITY_SCORE: 18

## 2023-10-23 ASSESSMENT — COPD QUESTIONNAIRES: COPD: 0

## 2023-10-23 NOTE — ANESTHESIA CARE TRANSFER NOTE
Patient: Pili Cuevas    Procedure: Procedure(s):  Excisional debridement, left great toe       Diagnosis: Cellulitis of toe of left foot [L03.032]  Open wound of toe, subsequent encounter [S92.557D]  Diagnosis Additional Information: No value filed.    Anesthesia Type:   MAC     Note:    Oropharynx: oropharynx clear of all foreign objects and spontaneously breathing  Level of Consciousness: awake  Oxygen Supplementation: room air    Independent Airway: airway patency satisfactory and stable  Dentition: dentition unchanged  Vital Signs Stable: post-procedure vital signs reviewed and stable  Report to RN Given: handoff report given  Patient transferred to: PACU    Handoff Report: Identifed the Patient, Identified the Reponsible Provider, Reviewed the pertinent medical history, Discussed the surgical course, Reviewed Intra-OP anesthesia mangement and issues during anesthesia, Set expectations for post-procedure period and Allowed opportunity for questions and acknowledgement of understanding      Vitals:  Vitals Value Taken Time   /65    Temp     Pulse 64    Resp 16    SpO2 92        Electronically Signed By: WILIAM Holguin CRNA  October 23, 2023  10:49 AM

## 2023-10-23 NOTE — OP NOTE
PREOPERATIVE DIAGNOSES:     1.  Left hallux ischemia s/p recent nail avulsion           POSTOPERATIVE DIAGNOSES:   1.  Left hallux ischemia s/p recent nail avulsion     PROCEDURE:     1. Excisional debridement, down to and including bone, for left hallux. Site measuring 3 cm x 2 cm x .2 cm        ANESTHESIA:  MAC with 20 ml of marcaine plain     HEMOSTASIS:  None      ESTIMATED BLOOD LOSS:  5 mL.       SPECIMENS:  Soft tissue culture      MATERIALS:  None     Indications: Patient presented the hospital for a worsening hallux ulcer. She had a nail avulsion done which subsequently became necrotic. She has ongoing pain and presented to the hospital. Since being here, was seen by vascular who found adequate blood flow for healing. She also had an MRI done which didn't show bone infection. Plan for today's procedure is for debridement of site, to remove the necrotic tissue to bleeding tissue that can heal. Discussed with her that if this doesn't improve, she may require a partial toe amputation at a later date. Procedure was discussed with patient at length, including all risks and benefits. Patient agrees to planned procedure.      Procedure: Under mild sedation pt was brought into the OR & placed on the operating table in a supine position. The foot was scrubbed, prepped and draped in an aseptic manner. Following this, the site was sharply debrided using a 15 blade and a . The necrotic tissue was excised from the nail bed, to bleeding tissue. The necrotic wound edges were excised. The dorsal aspect of the distal phalanx was also debrided using a rongeur. This was done until all sites were bleeding and appeared viable. The digit though was noted to be somewhat blanched in appearance during the procedure.     There was no mary purulence or signs of infection. Debrided bone was noted to be hard and viable. The site was covered with a sterile dressing.      The pt tolerated the procedure & anesthesia well.  She was  transferred to the recovery room with vital signs stable and vascular status intact to the right foot.  Following a period of post-op monitoring the pt will return to his hospital bed with the following written and oral post-op instructions:     Keep dressing clean, dry and intact.  Do not remove dressing.  WBAT right foot in sx shoe  Elevate foot at rest.  Continue IV antibiotics  Contact Dr. Royal if any post-op questions or arrise.      Intraop findings: No significant signs of infection. Debridement of necrotic tissue. Overall less than expected blood flow for procedure.      Post op plan: No further podiatric plans. Would change to oral abx per intra-op cultures. Will see on POD#1.

## 2023-10-23 NOTE — PLAN OF CARE
Goal Outcome Evaluation:    Diagnosis: Excisional debridement, left great toe today  Orientation: A&Ox4  Activity: Independent  Diet/BS Checks: Regular  Tele: n/a  IV Access/Drains: PIV SL w/ intermittent abx  Pain Management: denies pain at this time, Norco available  Abnormal VS/Results: mild HTN  Bowel/Bladder: Continent, had BM today  Skin/Wounds:   Consults: Podiatry, surgery  D/C Disposition: possible home 10/24  Other Info:

## 2023-10-23 NOTE — BRIEF OP NOTE
[facility]    Brief Operative Note    Pre-operative diagnosis: Cellulitis of toe of left foot [L03.032]  Open wound of toe, subsequent encounter [K88.132G]  Post-operative diagnosis Same as pre-operative diagnosis    Procedure: Excisional debridement, down to and including bone, for left hallux. Site measuring 3 cm x 2 cm x .2 cm     Surgeon: Surgeon(s) and Role:     * Jessica Royal DPM - Primary  Anesthesia: MAC with Local   Estimated Blood Loss: 5 mL from 10/23/2023 10:10 AM to 10/23/2023 10:46 AM      Drains: None  Specimens:   ID Type Source Tests Collected by Time Destination   A : Hallux left great toe Tissue Toe, Left ANAEROBIC BACTERIAL CULTURE ROUTINE, GRAM STAIN, AEROBIC BACTERIAL CULTURE ROUTINE Jessica Royal DPM 10/23/2023 10:38 AM      Findings:   Debrided all necrotic tissue, including dorsal bone. Bone hard and viable. No signs of infection. Less than expected bleeding for procedure.   Complications: None.  Implants: * No implants in log *

## 2023-10-23 NOTE — PROGRESS NOTES
Pt here with cellulitis of left toe/ debrided today. A&Ox4.  VSS on RA. Tolerating regular diet. Takes pills as a whole. Up with independently in the room. continent of bowel and bladder, c/o of constipation, miralax was administered. Moderate L toe  pain, decreased PO prn norco. Plan is to go home tomorrow. Will continue to monitor.

## 2023-10-23 NOTE — PROGRESS NOTES
[facility]    Medicine Progress Note - Hospitalist Service    Date of Admission:  10/20/2023    Assessment & Plan   Pili Cuevas is a 63 year old female with a past medical history significant for ovarian cancer s/p surgery and chemo 2021 with reoccurrence in 2023 not currently on treatment, HTN, HLD, oral herpes, depression who presented to the ED on 10/20/23 for left toe pain. Patient was admitted on 10/20/23 left big toe cellulitis.     Left Big Toe Cellulitis S/p  excisional debridement of Left TOE on 10/23/23 by Podiatry   PAD   Had toenail removed on 10/13/23 with PCP, started to worsen over the weekend. Saw PCP on 10/16/23 who had Podiatry see her, who started Abx and referred to Vascular Surgery. Was diagnosed with PAD but no intervention to be done. Pain, swelling and redness has been getting worse despite outpt abx. Patient is afebrile, vitally stable, has no WBC but does have an elevated CRP. Xray not showing osteo at this time but concerning for ?necrotic area on toe. Hx of history left first MTP.      - CRP: 7.41, ESR: 8   - Blood cx pending       - Podiatry Consulted might need amputation if the Toe is getting worse     - Consulted Vascular Surgery   As per vascular Surgery Noninvasive vascular studies reviewed. Left infrapopliteal disease with left NILAM 0.73 and toe pressure 93 mmHg indicating adequate perfusion for wound healing.   Recommend podiatry proceed with debridement/amputation as necessary  Started on Cilostazole 50mg PO BID and Statin dose increased to lipitor 80mg po Daily   Left Foot MRI done and Podiatry planning on excisional debride ment of Toe today      - Started on Cefepime and Vancomycin. Discontinued vancomycin, continue on cefepime    - Continue home Hydrocodone 3-352mg Q6H PRN     Chronic Medical Problems:     HTN   - Continue home Amlodipine 10mg daily     GERD   - Continue home Omeprazole 20mg daily    Depression    - Continue home Zoloft 100mg  "daily     Oral Herpes   - Continue home Acyclovir 400mg daily     Tobacco Abuse  Smokes a pack of day   - Nicotine patch ordered     Ovarian Cancer   Initially diagnosed in March 2021. Underwent MEENU and chemo. Was in remission until a reoccurrence was found in September 2023. She is in the process of moving to [place] and has an appointment with [facility] for next steps at the end of the month.       Constipation:  On bowel regimen             Diet: Regular Diet Adult    DVT Prophylaxis: Ambulate every shift  Perez Catheter: Not present  Lines: None     Cardiac Monitoring: None  Code Status: Full Code      Clinically Significant Risk Factors                         # Obesity: Estimated body mass index is 31.14 kg/m  as calculated from the following:    Height as of this encounter: 1.707 m (5' 7.2\").    Weight as of this encounter: 90.7 kg (200 lb)., PRESENT ON ADMISSION            Disposition Plan     Expected Discharge Date: 10/24/2023                    Olya Hamilton MD  Hospitalist Service  [facility]  Securely message with [employer] (more info)  Text page via [employer] Paging/Directory   ______________________________________________________________________    Interval History     Patient is resting comfortably in bed.  Complains of left toe pain intermittently.    She denies any chest pain or shortness of breath no other acute events in the last 24 hours.       Physical Exam   Vital Signs: Temp: (!) 96.6  F (35.9  C) Temp src: Oral BP: 128/79 Pulse: 63   Resp: 16 SpO2: 98 % O2 Device: None (Room air) Oxygen Delivery: 2 LPM  Weight: 200 lbs 0 oz    General Appearance: Alert awake, not in acute distress pleasant female sitting comfortably in bed  Respiratory: Clear to auscultation bilaterally  Cardiovascular: Normal rate rhythm regular  GI: Soft, nontender nondistended bowel sounds positive  Skin: Left great toe redness is improving  with some area of ischemia with " black eschar noted  Other:      Medical Decision Making       52 MINUTES SPENT BY ME on the date of service doing chart review, history, exam, documentation & further activities per the note.      Data         Imaging results reviewed over the past 24 hrs:   No results found for this or any previous visit (from the past 24 hour(s)).

## 2023-10-23 NOTE — PLAN OF CARE
Goal Outcome Evaluation:         Orientation: A&Ox4  Activity: Independent  Diet/BS Checks: NPO since midnight  Tele: n/a  IV Access/Drains: PIV SL w/ int abx  Pain Management: Norco x1  Abnormal VS/Results: mild HTN  Bowel/Bladder: Continent, Last BM 10/22. Pt reported mild constipation, PRN senna given in evening  Skin/Wounds: black wound to L great toe, redness to surrounding tissue  Consults: Podiatry, surgery  D/C Disposition: Pending debridement and treatment plan, likely home 10/24  Other Info:   Plan for excisional debridement 10/23 afternoon

## 2023-10-23 NOTE — ANESTHESIA POSTPROCEDURE EVALUATION
Patient: Pili Cuevas    Procedure: Procedure(s):  Excisional debridement, left great toe       Anesthesia Type:  MAC    Note:     Postop Pain Control: Uneventful            Sign Out: Well controlled pain   PONV: No   Neuro/Psych: Uneventful            Sign Out: Acceptable/Baseline neuro status   Airway/Respiratory: Uneventful            Sign Out: Acceptable/Baseline resp. status   CV/Hemodynamics: Uneventful            Sign Out: Acceptable CV status; No obvious hypovolemia; No obvious fluid overload   Other NRE: NONE   DID A NON-ROUTINE EVENT OCCUR? No           Last vitals:  Vitals Value Taken Time   /68 10/23/23 1100   Temp 35.9  C (96.6  F) 10/23/23 1049   Pulse 64 10/23/23 1109   Resp 22 10/23/23 1109   SpO2 94 % 10/23/23 1109   Vitals shown include unfiled device data.    Electronically Signed By: Juan C Foley MD  October 23, 2023  11:11 AM

## 2023-10-23 NOTE — ANESTHESIA PREPROCEDURE EVALUATION
Anesthesia Pre-Procedure Evaluation    Patient: Pili Cuevas   MRN: [id number] : 1960        Procedure : Procedure(s):  Excisional debridement, left great toe          Past Medical History:   Diagnosis Date    Depression     History of ovarian cancer     S/p surgery and chemo , now with reoccurance     HLD (hyperlipidemia)     HTN (hypertension)       Past Surgical History:   Procedure Laterality Date    C6-7 fusion      HYSTERECTOMY TOTAL ABDOMINAL      Instrumented arthrodesis of the first MTP joint with osseous fusion        Allergies   Allergen Reactions    Epinephrine     Trazodone Dizziness      Social History     Tobacco Use    Smoking status: Every Day     Packs/day: 1     Types: Cigarettes    Smokeless tobacco: Not on file   Substance Use Topics    Alcohol use: Not on file      Wt Readings from Last 1 Encounters:   10/20/23 90.7 kg (200 lb)        Anesthesia Evaluation   Pt has had prior anesthetic.     No history of anesthetic complications       ROS/MED HX  ENT/Pulmonary:     (+)                tobacco use, Current use,                   (-) COPD and sleep apnea   Neurologic:       Cardiovascular:     (+) Dyslipidemia hypertension- -   -  - -                                      METS/Exercise Tolerance:     Hematologic:       Musculoskeletal:       GI/Hepatic:     (+) GERD, Asymptomatic on medication,                  Renal/Genitourinary:       Endo:     (+)               Obesity (BMI 31),       Psychiatric/Substance Use:     (+) psychiatric history depression       Infectious Disease: Comment: Cellulitis toe      Malignancy:   (+) Malignancy (ovarian cancer treated in , recently discovered recurrence with multiple abdominal mets),     Other:            Physical Exam    Airway        Mallampati: III   TM distance: > 3 FB   Neck ROM: full     Respiratory Devices and Support         Dental       (+) Minor Abnormalities - some fillings, tiny chips      Cardiovascular    "cardiovascular exam normal          Pulmonary   pulmonary exam normal                OUTSIDE LABS:  CBC:   Lab Results   Component Value Date    WBC 7.2 10/20/2023    HGB 13.4 10/20/2023    HCT 40.1 10/20/2023     (L) 10/20/2023     BMP:   Lab Results   Component Value Date     10/20/2023    POTASSIUM 3.7 10/20/2023    CHLORIDE 102 10/20/2023    CO2 27 10/20/2023    BUN 10.0 10/20/2023    CR 0.46 (L) 10/20/2023     (H) 10/20/2023     COAGS: No results found for: \"PTT\", \"INR\", \"FIBR\"  POC: No results found for: \"BGM\", \"HCG\", \"HCGS\"  HEPATIC:   Lab Results   Component Value Date    ALBUMIN 4.1 10/20/2023    PROTTOTAL 6.5 10/20/2023    ALT 18 10/20/2023    AST 21 10/20/2023    ALKPHOS 92 10/20/2023    BILITOTAL 0.3 10/20/2023     OTHER:   Lab Results   Component Value Date    A1C 5.6 10/20/2023    JUN 9.4 10/20/2023    SED 8 10/20/2023       Anesthesia Plan    ASA Status:  4    NPO Status:  NPO Appropriate    Anesthesia Type: MAC.     - Reason for MAC: straight local not clinically adequate              Consents    Anesthesia Plan(s) and associated risks, benefits, and realistic alternatives discussed. Questions answered and patient/representative(s) expressed understanding.     - Discussed:     - Discussed with:  Patient            Postoperative Care    Pain management: IV analgesics, Multi-modal analgesia.   PONV prophylaxis: Ondansetron (or other 5HT-3)     Comments:    Other Comments: H&P reviewed    No N/V.  MAC acceptable            Juan C Foley MD  "

## 2023-10-24 VITALS
HEART RATE: 75 BPM | OXYGEN SATURATION: 94 % | HEIGHT: 67 IN | TEMPERATURE: 98.2 F | BODY MASS INDEX: 31.39 KG/M2 | RESPIRATION RATE: 17 BRPM | DIASTOLIC BLOOD PRESSURE: 71 MMHG | SYSTOLIC BLOOD PRESSURE: 134 MMHG | WEIGHT: 200 LBS

## 2023-10-24 LAB
ALBUMIN UR-MCNC: NEGATIVE MG/DL
APPEARANCE UR: CLEAR
BILIRUB UR QL STRIP: NEGATIVE
COLOR UR AUTO: NORMAL
CREAT SERPL-MCNC: 0.54 MG/DL (ref 0.51–0.95)
EGFRCR SERPLBLD CKD-EPI 2021: >90 ML/MIN/1.73M2
GLUCOSE BLDC GLUCOMTR-MCNC: 111 MG/DL (ref 70–99)
GLUCOSE BLDC GLUCOMTR-MCNC: 98 MG/DL (ref 70–99)
GLUCOSE UR STRIP-MCNC: NEGATIVE MG/DL
HGB UR QL STRIP: NEGATIVE
HOLD SPECIMEN: NORMAL
KETONES UR STRIP-MCNC: NEGATIVE MG/DL
LEUKOCYTE ESTERASE UR QL STRIP: NEGATIVE
NITRATE UR QL: NEGATIVE
PH UR STRIP: 5 [PH] (ref 5–7)
SP GR UR STRIP: 1.01 (ref 1–1.03)
UROBILINOGEN UR STRIP-MCNC: NORMAL MG/DL

## 2023-10-24 PROCEDURE — 82565 ASSAY OF CREATININE: CPT | Performed by: STUDENT IN AN ORGANIZED HEALTH CARE EDUCATION/TRAINING PROGRAM

## 2023-10-24 PROCEDURE — 250N000013 HC RX MED GY IP 250 OP 250 PS 637: Performed by: PODIATRIST

## 2023-10-24 PROCEDURE — L3260 AMBULATORY SURGICAL BOOT EAC: HCPCS

## 2023-10-24 PROCEDURE — 81003 URINALYSIS AUTO W/O SCOPE: CPT | Performed by: INTERNAL MEDICINE

## 2023-10-24 PROCEDURE — 250N000013 HC RX MED GY IP 250 OP 250 PS 637: Performed by: STUDENT IN AN ORGANIZED HEALTH CARE EDUCATION/TRAINING PROGRAM

## 2023-10-24 PROCEDURE — 250N000013 HC RX MED GY IP 250 OP 250 PS 637: Performed by: INTERNAL MEDICINE

## 2023-10-24 PROCEDURE — 99239 HOSP IP/OBS DSCHRG MGMT >30: CPT | Performed by: INTERNAL MEDICINE

## 2023-10-24 PROCEDURE — 36415 COLL VENOUS BLD VENIPUNCTURE: CPT | Performed by: STUDENT IN AN ORGANIZED HEALTH CARE EDUCATION/TRAINING PROGRAM

## 2023-10-24 RX ORDER — ATORVASTATIN CALCIUM 80 MG/1
80 TABLET, FILM COATED ORAL EVERY EVENING
Qty: 90 TABLET | Refills: 0 | Status: SHIPPED | OUTPATIENT
Start: 2023-10-24

## 2023-10-24 RX ORDER — NICOTINE 21 MG/24HR
1 PATCH, TRANSDERMAL 24 HOURS TRANSDERMAL DAILY
Qty: 30 PATCH | Refills: 0 | Status: SHIPPED | OUTPATIENT
Start: 2023-10-24

## 2023-10-24 RX ORDER — POLYETHYLENE GLYCOL 3350 17 G/17G
17 POWDER, FOR SOLUTION ORAL DAILY
Qty: 510 G | Refills: 0 | Status: SHIPPED | OUTPATIENT
Start: 2023-10-24

## 2023-10-24 RX ORDER — CEFUROXIME AXETIL 500 MG/1
500 TABLET ORAL EVERY 12 HOURS
Qty: 14 TABLET | Refills: 0 | Status: SHIPPED | OUTPATIENT
Start: 2023-10-24 | End: 2023-10-31

## 2023-10-24 RX ORDER — HYDROCODONE BITARTRATE AND ACETAMINOPHEN 5; 325 MG/1; MG/1
1 TABLET ORAL EVERY 8 HOURS PRN
Qty: 12 TABLET | Refills: 0 | Status: SHIPPED | OUTPATIENT
Start: 2023-10-24 | End: 2023-10-28

## 2023-10-24 RX ORDER — CILOSTAZOL 50 MG/1
50 TABLET ORAL 2 TIMES DAILY
Qty: 90 TABLET | Refills: 0 | Status: SHIPPED | OUTPATIENT
Start: 2023-10-24

## 2023-10-24 RX ORDER — ASPIRIN 81 MG/1
81 TABLET ORAL DAILY
Qty: 100 TABLET | Refills: 0 | Status: SHIPPED | OUTPATIENT
Start: 2023-10-25

## 2023-10-24 RX ORDER — CEFUROXIME AXETIL 500 MG/1
500 TABLET ORAL EVERY 12 HOURS SCHEDULED
Status: DISCONTINUED | OUTPATIENT
Start: 2023-10-24 | End: 2023-10-24 | Stop reason: HOSPADM

## 2023-10-24 RX ADMIN — AMLODIPINE BESYLATE 10 MG: 10 TABLET ORAL at 09:05

## 2023-10-24 RX ADMIN — DOCUSATE SODIUM 50 MG AND SENNOSIDES 8.6 MG 1 TABLET: 8.6; 5 TABLET, FILM COATED ORAL at 09:04

## 2023-10-24 RX ADMIN — ACYCLOVIR 400 MG: 400 TABLET ORAL at 09:05

## 2023-10-24 RX ADMIN — PANTOPRAZOLE SODIUM 40 MG: 40 TABLET, DELAYED RELEASE ORAL at 09:05

## 2023-10-24 RX ADMIN — SERTRALINE HYDROCHLORIDE 100 MG: 100 TABLET ORAL at 09:05

## 2023-10-24 RX ADMIN — CEFUROXIME AXETIL 500 MG: 500 TABLET ORAL at 09:05

## 2023-10-24 RX ADMIN — CILOSTAZOL 50 MG: 50 TABLET ORAL at 06:32

## 2023-10-24 RX ADMIN — ACETAMINOPHEN 975 MG: 325 TABLET, FILM COATED ORAL at 09:05

## 2023-10-24 RX ADMIN — ASPIRIN 81 MG: 81 TABLET, COATED ORAL at 09:05

## 2023-10-24 RX ADMIN — POLYETHYLENE GLYCOL 3350 17 G: 17 POWDER, FOR SOLUTION ORAL at 09:05

## 2023-10-24 ASSESSMENT — ACTIVITIES OF DAILY LIVING (ADL)
ADLS_ACUITY_SCORE: 18

## 2023-10-24 NOTE — DISCHARGE SUMMARY
10/24/2023  4:19 PM     Pt discharged to home with daughter Ketan at bedside and transporting her home. Medications, follow-ups, and AVS reviewed with questions answered. Wound care supplies given, patient belongings taken with patient. Orthotic foot device placed on patient as well.     India Moy RN

## 2023-10-24 NOTE — CARE PLAN
Primary Diagnosis: excisional debridement L great toe POD1  Orientation: A&Ox4  Aggression Stop Light: green  Mobility: WBAT LLE, ind  Pain Management: denies  Diet: tolerating regular  Bowel/Bladder: cont B/B  Abnormal Lab/Assessments:   Drain/Device/Wound: no iv access, LLE dressing changed by podiatry.   Consults: none, podiatry signed off.   D/C Day/Goals/Place: Discharge home with daughter

## 2023-10-24 NOTE — PLAN OF CARE
Goal Outcome Evaluation:    Orientation: A&Ox4  Activity: SBA  Diet/BS Checks: regular  Tele:  N/A  IV Access/Drains: L PIV SL w/int. abx  Pain Management: denies  Abnormal VS/Results: VSS  Bowel/Bladder: cont. B/B  Skin/Wounds: L Big Toe debridement done, covered with bandage   Consults: Podiatry  D/C Disposition: possible discharge 10/24, pt complaining of painful burning when urinating, UA ordered. Pt complaining of rt arm where IV site was previously, warm pack applied.

## 2023-10-24 NOTE — PROGRESS NOTES
"[facility]      ASSESSMENT:  63-year-old female with past medical history significant for ovarian cancer, hypertension, hyperlipidemia, depression who presented to the emergency department on 10/20/2023 due to progressive pain in the left great toe, after a toenail avulsion procedure 1 week prior.    -Now s/p dorsal hallux debridement on 10/23    MEDICAL DECISION MAKING:  -Discussed all findings with patient. Chart and imaging reviewed.   -Dressing changed at bedside: site appears more viable and vascular. No necrosis or ischemia.   -Okay to discharge today. No significant signs of infection noted intra-op. Likely no further abx needed, it appears she had been on them for several days prior and during admission.   -Rx written for norco for 4 day supply, although patient states her pain is much improved.   -Discussed dressing change plans.   -WBAT to LLE. Surgical shoe ordered.   -Provided phone numbers for podiatry at [facility], where she hopes to continue her care.   -Will sign off. Call with questions.           Jessica Royal DPM   [facility] Department of Podiatry/Foot & Ankle Surgery  [phone]      _______________________________________________________________________      SUBJECTIVE: Seen in follow up for her left foot. Had dorsal hallux debridement yesterday. States feels well and pain is improved.     EXAM:    Vital signs:  Temp: 98.2  F (36.8  C) Temp src: Oral BP: (!) 145/80 Pulse: 72   Resp: 18 SpO2: 92 % O2 Device: None (Room air) Oxygen Delivery: 2 LPM Height: 170.7 cm (5' 7.2\") Weight: 90.7 kg (200 lb)  Estimated body mass index is 31.14 kg/m  as calculated from the following:    Height as of this encounter: 1.707 m (5' 7.2\").    Weight as of this encounter: 90.7 kg (200 lb).      Vascular: pedal pulses not readily palpable, left foot     Neurological: light touch sensation is intact     Musculoskeletal: no gross deformities. Let 1st MTPJ fusion    "   Dermatological: Left foot: dorsal hallux site with sanguinous drainage to dressing. Tissue to wound bed granular. No purulence. No ischemia or gangrene. No cellulitis. Overall improved.      EXAM: MR FOOT LEFT W/O and W CONTRAST  LOCATION: [facility]  DATE: 10/21/2023     INDICATION: Evaluate for early osteomyelitis and abscess, left great toe  COMPARISON: 10/20/2023.  TECHNIQUE: Routine. Additional postgadolinium T1 sequences were obtained.  IV CONTRAST: 9mL Gadavist     FINDINGS:      Artifact from the first MTP arthrodesis. Artifact limits assessment surrounding the bone/metallic interface. The extensor tendon to the first toe is not well evaluated. Distal to the hardware, there is no evidence of osteomyelitis of the great toe distal   phalanx. There is minimal bone marrow edema along the distal tuft of the great toe without marrow replacement. There is no large or well-defined abscess/fluid collection.     Remaining bones of the forefoot show mild scattered arthritic changes. No evidence of an acute fracture.     No tenosynovitis. Apart from the extensor tendon to the toe which is not well assessed, the remaining tendons show no acute abnormality or tenosynovitis. Mild fatty atrophy intrinsic muscles of the foot.                                                              IMPRESSION:  1.  Postoperative changes first MTP arthrodesis. Artifact from hardware limits local assessment of the bone/metallic interface and early signs of osteomyelitis. No discrete or large soft tissue abscess.   2.  Minimal bone marrow edema distal phalanx/tuft of the great toe felt to be reactive. No definitive evidence of osteomyelitis at this time.  3.  Extensor tendon to the great toe is not well evaluated at the level of the hardware. Otherwise tendons of the forefoot show no acute abnormality or tenosynovitis.  4.  Mild scattered arthritic changes the forefoot.  5.  Mild fatty atrophy of the intrinsic  "muscles of the foot.    FOOT LEFT THREE OR MORE VIEWS   DATE/TIME: 10/20/2023 11:33 AM      INDICATION: Left foot infection.   COMPARISON: None.                                                                      IMPRESSION:  1.  No fracture, focal bone erosion, or joint malalignment.  2.  Instrumented arthrodesis of the first MTP joint with osseous  fusion. The hardware is intact.  3.  Achilles calcaneal spur.        COCO MCCOLLUM MD        Lab Results   Component Value Date    WBC 7.2 10/20/2023     Lab Results   Component Value Date    RBC 4.05 10/20/2023     Lab Results   Component Value Date    HGB 13.4 10/20/2023     Lab Results   Component Value Date    HCT 40.1 10/20/2023     No components found for: \"MCT\"  Lab Results   Component Value Date    MCV 99 10/20/2023     Lab Results   Component Value Date    MCH 33.1 10/20/2023     Lab Results   Component Value Date    MCHC 33.4 10/20/2023     Lab Results   Component Value Date    RDW 14.7 10/20/2023     Lab Results   Component Value Date     10/20/2023       All cultures:  No results for input(s): \"CULT\" in the last 168 hours.    Hemoglobin   Date Value Ref Range Status   10/20/2023 13.4 11.7 - 15.7 g/dL Final         "

## 2023-10-24 NOTE — PROGRESS NOTES
Hospitalist service cross-cover note:     Paged by RN for patient c/o pain.burning with urination. UA ordered.     Star Mcfadden MD   Hospitalist  [phone]

## 2023-10-24 NOTE — DISCHARGE SUMMARY
"[facility]  Hospitalist Discharge Summary      Date of Admission:  10/20/2023  Date of Discharge:  10/24/2023  Discharging Provider: Olya Hamilton MD  Discharge Service: Hospitalist Service    Discharge Diagnoses   Please see hospital course     Clinically Significant Risk Factors     # Obesity: Estimated body mass index is 31.14 kg/m  as calculated from the following:    Height as of this encounter: 1.707 m (5' 7.2\").    Weight as of this encounter: 90.7 kg (200 lb).       Follow-ups Needed After Discharge   Follow-up Appointments     Follow-up and recommended labs and tests       You can follow up with  Jessica Royal DPM [facility],   located at [address] [place] [address] [address], [place] [address]. Phone number is   [phone]. Call to schedule appt in 2-3 weeks if needed.        Follow-up and recommended labs and tests       Follow up with primary care provider, Nickie Boyd, within 7 days for   hospital follow- up.  The following labs/tests are recommended: cbc, bmp   in 1week.    F/u with podiatry in 1-2weeks           Unresulted Labs Ordered in the Past 30 Days of this Admission       Date and Time Order Name Status Description    10/23/2023 10:38 AM Tissue Aerobic Bacterial Culture Routine Preliminary     10/23/2023 10:38 AM Anaerobic Bacterial Culture Routine In process     10/20/2023  2:48 PM Blood Culture Peripheral Blood Preliminary     10/20/2023  2:48 PM Blood Culture Peripheral Blood Preliminary             Discharge Disposition   Discharged to home  Condition at discharge: Stable    Hospital Course   Pili Cuevas is a 63 year old female with a past medical history significant for ovarian cancer s/p surgery and chemo 2021 with reoccurrence in 2023 not currently on treatment, HTN, HLD, oral herpes, depression who presented to the ED on 10/20/23 for left toe pain. Patient was admitted on 10/20/23 left big toe cellulitis.      Left Big Toe Cellulitis S/p  " excisional debridement of Left TOE on 10/23/23 by Podiatry   PAD   Had toenail removed on 10/13/23 with PCP, started to worsen over the weekend. Saw PCP on 10/16/23 who had Podiatry see her, who started Abx and referred to Vascular Surgery. Was diagnosed with PAD but no intervention to be done. Pain, swelling and redness has been getting worse despite outpt abx. Patient is afebrile, vitally stable, has no WBC but does have an elevated CRP. Xray not showing osteo at this time but concerning for ?necrotic area on toe. Hx of history left first MTP.                   - CRP: 7.41, ESR: 8               - Blood cx pending                               - Podiatry Consulted might need amputation if the Toe is getting worse                             - Consulted Vascular Surgery   As per vascular Surgery Noninvasive vascular studies reviewed. Left infrapopliteal disease with left NILAM 0.73 and toe pressure 93 mmHg indicating adequate perfusion for wound healing.   Recommend podiatry proceed with debridement/amputation as necessary  Started on Cilostazole 50mg PO BID and Statin dose increased to lipitor 80mg po Daily   Left Foot MRI done and Podiatry planning on excisional debride ment of Toe today                   - Started on Cefepime and will discharge with oral ceftin BID Vancomycin. Discontinued vancomycin, continue on cefepime                - Continue home Hydrocodone 3-352mg Q6H PRN      Chronic Medical Problems:      HTN               - Continue home Amlodipine 10mg daily      GERD               - Continue home Omeprazole 20mg daily     Depression                - Continue home Zoloft 100mg daily      Oral Herpes               - Continue home Acyclovir 400mg daily      Tobacco Abuse  Smokes a pack of day               - Nicotine patch ordered      Ovarian Cancer   Initially diagnosed in March 2021. Underwent MEENU and chemo. Was in remission until a reoccurrence was found in September 2023. She is in the process of  "moving to [place] and has an appointment with [facility] for next steps at the end of the month.         Constipation:  On bowel regimen               Diet: Regular Diet Adult    DVT Prophylaxis: Ambulate every shift  Perez Catheter: Not present  Lines: None     Cardiac Monitoring: None  Code Status: Full Code          Clinically Significant Risk Factors                          # Obesity: Estimated body mass index is 31.14 kg/m  as calculated from the following:    Height as of this encounter: 1.707 m (5' 7.2\").    Weight as of this encounter: 90.7 kg (200 lb)., PRESENT ON ADMISSION                   Disposition Plan     Expected Discharge Date: 10/24/2023                         Olya Hamilton MD  Hospitalist Service  [facility]  Securely message with [employer] (more info)  Text page via [employer] ([employer]) Paging/Directory     Consultations This Hospital Stay   PHARMACY TO DOSE VANCO  PODIATRY IP CONSULT  VASCULAR SURGERY IP CONSULT  PODIATRY IP CONSULT  ORTHOSIS EXTREMITY LOWER REFERRAL IP CONSULT    Code Status   Full Code    Time Spent on this Encounter   I, Olya Hamilton MD, personally saw the patient today and spent greater than 30 minutes discharging this patient.       Olya Hamilton MD  [place] [address] [facility]  [address]  [facility] [address]  Phone: [phone]  ______________________________________________________________________    Physical Exam   Vital Signs: Temp: 98.2  F (36.8  C) Temp src: Oral BP: (!) 145/80 Pulse: 72   Resp: 18 SpO2: 92 % O2 Device: None (Room air)    Weight: 200 lbs 0 oz  General Appearance: Alert, awake, NAD   Respiratory: CTA b/l   Cardiovascular: RRR  GI: soft, NT, ND, BS+  Skin: warm and dry   Other: Left foot in a dressing         Primary Care Physician   Nickie Boyd    Discharge Orders      Physical Therapy Referral      Orthopedic  Referral      Activity    WBAT to LE in surgical shoe. " Elevate while at rest.  Leave dressing to foot  at all times. Do not get wet in the shower.     Change dressing every other day: apply adaptic to wound bed. Cover with gauze. Tape or wrap site as needed.   Contact the office if you have any concerns.     Follow-up and recommended labs and tests     You can follow up with  Jessica Royal DPM at [facility], located at [address] [place] [address] [address], [place] [id number]. Phone number is [phone]. Call to schedule appt in 2-3 weeks if needed.     Reason for your hospital stay    Left toe wound with cellulitis S/o debridement by Podiatry     Follow-up and recommended labs and tests     Follow up with primary care provider, Nickie Boyd, within 7 days for hospital follow- up.  The following labs/tests are recommended: cbc, bmp in 1week.    F/u with podiatry in 1-2weeks     Activity    Your activity upon discharge: activity as tolerated     Diet    Follow this diet upon discharge: Orders Placed This Encounter      Regular Diet Adult       Significant Results and Procedures   Most Recent 3 CBC's:  Recent Labs   Lab Test 10/20/23  1101   WBC 7.2   HGB 13.4   MCV 99   *     Most Recent 3 BMP's:  Recent Labs   Lab Test 10/24/23  0959 10/24/23  0750 10/24/23  0201 10/20/23  1101   NA  --   --   --  137   POTASSIUM  --   --   --  3.7   CHLORIDE  --   --   --  102   CO2  --   --   --  27   BUN  --   --   --  10.0   CR  --  0.54  --  0.46*   ANIONGAP  --   --   --  8   JUN  --   --   --  9.4   GLC 98  --  111* 122*     Most Recent 2 LFT's:  Recent Labs   Lab Test 10/20/23  1101   AST 21   ALT 18   ALKPHOS 92   BILITOTAL 0.3   ,   Results for orders placed or performed during the hospital encounter of 10/20/23   Foot XR, G/E 3 views, left    Narrative    FOOT LEFT THREE OR MORE VIEWS   DATE/TIME: 10/20/2023 11:33 AM     INDICATION: Left foot infection.   COMPARISON: None.      Impression    IMPRESSION:  1.  No fracture, focal bone erosion, or joint  malalignment.  2.  Instrumented arthrodesis of the first MTP joint with osseous  fusion. The hardware is intact.  3.  Achilles calcaneal spur.       COCO MCCOLLUM MD         SYSTEM ID:  [id number]   US NILAM Doppler No Exercise    Narrative    EXAM: RESTING ANKLE-BRACHIAL INDICES (ABIs)  LOCATION: [facility]  DATE: 10/20/2023    INDICATION: Left toe wound, decreased lower extremity pulses, lower extremity pain.  COMPARISON: None.    NILAM FINDINGS:  RIGHT  Brachial: 164  Ankle (PT): Occluded Index: NC  Ankle (DP): 110 Index: 0.67    LEFT  Brachial: 164  Ankle (PT): 116 Index: 0.71  Ankle (DP): 119 Index: 0.73    The right NILAM at rest is 0.67. The left NILAM at rest is 0.73.      WAVEFORMS: The dorsalis pedis and posterior tibial arteries are monophasic.      Impression    IMPRESSION:  1.  RIGHT LOWER EXTREMITY: Resting ankle-brachial index of 0.67 reflecting moderate peripheral arterial disease. Digit pressure of 70 mm Hg suggests adequate wound healing potential.  2.  LEFT LOWER EXTREMITY: Resting ankle-brachial index of 0.73 reflecting moderate peripheral arterial disease. Digit pressure of 93 mm Hg suggests adequate wound healing potential.   US Lower Extremity Arterial Duplex Bilateral    Narrative    EXAM: US LOWER EXTREMITY ARTERIAL DUPLEX BILATERAL  LOCATION: [facility]  DATE: 10/20/2023    INDICATION: Left toe wounds, worsening pain, swelling and redness  COMPARISON: None.  TECHNIQUE: Duplex utilizing 2D gray-scale imaging, Doppler interrogation with color-flow and spectral waveform analysis.    FINDINGS:     Right - Normal multiphasic waveforms and velocities in the common femoral and superficial femoral arteries with occlusion at the level of the distal superficial femoral artery. There is reconstitution at the level of the distal popliteal artery with   blunted, monophasic waveforms below this level. Unable to visualize right peroneal artery, could be chronically  occluded.    Left- Normal multiphasic waveforms and velocities in the common femoral artery through the mid popliteal artery. Scattered calcified atherosclerosis noted. Blunted, monophasic waveforms seen in the distal popliteal and calf arteries, although no distinct   stenotic area is visualized.    RIGHT LOWER EXTREMITY ARTERIAL ASSESSMENT:    Common femoral artery: 63 cm/s  Profunda femoris artery: 73 cm/s  SFA (proximal): 58 cm/s  SFA (mid): 40 cm/s  SFA (distal): Occluded  Popliteal artery (proximal): Occluded  Popliteal artery (distal): 21 cm/s  Anterior tibial artery: 45 cm/s  Posterior tibial artery: 7 cm/s  Peroneal artery: Not visualized.  Dorsalis pedis artery: 52 cm/s    LEFT LOWER EXTREMITY ARTERIAL ASSESSMENT:    Common femoral artery: 110 cm/s  Profunda femoris artery: 108 cm/s  SFA (proximal): 111 cm/s  SFA (mid): 90 cm/s  SFA (distal): 133 cm/s  Popliteal artery (proximal): 75 cm/s  Popliteal artery (distal): 39 cm/s  Anterior tibial artery: 113 cm/s  Posterior tibial artery: 48 cm/s  Peroneal artery: 41 cm/s  Dorsalis pedis artery: 44 cm/s        Impression    IMPRESSION:  1.  Occlusion of the right distal superficial femoral artery with reconstitution the level of the distal popliteal artery. Blunted, monophasic waveforms below this point.  2.  Blunted, monophasic waveforms below the knee in the left lower extremity without focal stenosis or occlusion visualized.     MR Foot Left w/o & w Contrast    Narrative    EXAM: MR FOOT LEFT W/O and W CONTRAST  LOCATION: [facility]  DATE: 10/21/2023    INDICATION: Evaluate for early osteomyelitis and abscess, left great toe  COMPARISON: 10/20/2023.  TECHNIQUE: Routine. Additional postgadolinium T1 sequences were obtained.  IV CONTRAST: 9mL Gadavist    FINDINGS:     Artifact from the first MTP arthrodesis. Artifact limits assessment surrounding the bone/metallic interface. The extensor tendon to the first toe is not well evaluated.  Distal to the hardware, there is no evidence of osteomyelitis of the great toe distal   phalanx. There is minimal bone marrow edema along the distal tuft of the great toe without marrow replacement. There is no large or well-defined abscess/fluid collection.    Remaining bones of the forefoot show mild scattered arthritic changes. No evidence of an acute fracture.    No tenosynovitis. Apart from the extensor tendon to the toe which is not well assessed, the remaining tendons show no acute abnormality or tenosynovitis. Mild fatty atrophy intrinsic muscles of the foot.        Impression    IMPRESSION:  1.  Postoperative changes first MTP arthrodesis. Artifact from hardware limits local assessment of the bone/metallic interface and early signs of osteomyelitis. No discrete or large soft tissue abscess.   2.  Minimal bone marrow edema distal phalanx/tuft of the great toe felt to be reactive. No definitive evidence of osteomyelitis at this time.  3.  Extensor tendon to the great toe is not well evaluated at the level of the hardware. Otherwise tendons of the forefoot show no acute abnormality or tenosynovitis.  4.  Mild scattered arthritic changes the forefoot.  5.  Mild fatty atrophy of the intrinsic muscles of the foot.         Discharge Medications   Current Discharge Medication List        START taking these medications    Details   aspirin 81 MG EC tablet Take 1 tablet (81 mg) by mouth daily  Qty: 100 tablet, Refills: 0    Associated Diagnoses: PAD (peripheral artery disease) (H24)      cefuroxime (CEFTIN) 500 MG tablet Take 1 tablet (500 mg) by mouth every 12 hours for 7 days  Qty: 14 tablet, Refills: 0    Associated Diagnoses: Cellulitis of toe of left foot      cilostazol (PLETAL) 50 MG tablet Take 1 tablet (50 mg) by mouth 2 times daily  Qty: 90 tablet, Refills: 0    Associated Diagnoses: PAD (peripheral artery disease) (H24)      nicotine (NICODERM CQ) 21 MG/24HR 24 hr patch Place 1 patch onto the skin  daily  Qty: 30 patch, Refills: 0    Associated Diagnoses: Cellulitis of toe of left foot      polyethylene glycol (MIRALAX) 17 GM/Dose powder Take 17 g by mouth daily  Qty: 510 g, Refills: 0    Associated Diagnoses: Cellulitis of toe of left foot           CONTINUE these medications which have CHANGED    Details   atorvastatin (LIPITOR) 80 MG tablet Take 1 tablet (80 mg) by mouth every evening  Qty: 90 tablet, Refills: 0    Associated Diagnoses: PAD (peripheral artery disease) (H24)      HYDROcodone-acetaminophen (NORCO) 5-325 MG tablet Take 1 tablet by mouth every 8 hours as needed for pain  Qty: 12 tablet, Refills: 0    Associated Diagnoses: Open wound of toe, subsequent encounter           CONTINUE these medications which have NOT CHANGED    Details   acyclovir (ZOVIRAX) 400 MG tablet Take 400 mg by mouth daily      amLODIPine (NORVASC) 10 MG tablet Take 10 mg by mouth daily      cetirizine (ZYRTEC) 10 MG tablet Take 10 mg by mouth daily      omeprazole (PRILOSEC) 20 MG DR capsule Take 20 mg by mouth daily      sertraline (ZOLOFT) 100 MG tablet Take 100 mg by mouth daily           STOP taking these medications       cephALEXin (KEFLEX) 500 MG capsule Comments:   Reason for Stopping:             Allergies   Allergies   Allergen Reactions    Epinephrine     Trazodone Dizziness

## 2023-10-24 NOTE — PROGRESS NOTES
A&0X4, reg diet,IND, able to make needs known,  PIV SL, VSS on RA, pain managed with current regimen.

## 2023-10-25 LAB
BACTERIA BLD CULT: NO GROWTH
BACTERIA BLD CULT: NO GROWTH

## 2023-10-27 LAB — BACTERIA TISS BX CULT: ABNORMAL

## 2023-10-30 LAB — BACTERIA TISS BX CULT: NORMAL

## (undated) DEVICE — PACK EXTREMITY SOP15EXFSD

## (undated) DEVICE — GLOVE BIOGEL PI MICRO INDICATOR UNDERGLOVE SZ 7.0 48970

## (undated) DEVICE — SOL NACL 0.9% IRRIG 1000ML BOTTLE 2F7124

## (undated) DEVICE — NDL 25GA 1.5" 305127

## (undated) DEVICE — BNDG ELASTIC 4"X5YDS UNSTERILE 6611-40

## (undated) DEVICE — LINEN TOWEL PACK X5 5464

## (undated) DEVICE — PREP SKIN SCRUB TRAY 4461A

## (undated) DEVICE — GLOVE BIOGEL PI SZ 6.5 40865

## (undated) DEVICE — SPECIMEN CULTURETTE DBL SWAB 220109

## (undated) RX ORDER — FENTANYL CITRATE 50 UG/ML
INJECTION, SOLUTION INTRAMUSCULAR; INTRAVENOUS
Status: DISPENSED
Start: 2023-10-23

## (undated) RX ORDER — BUPIVACAINE HYDROCHLORIDE 5 MG/ML
INJECTION, SOLUTION EPIDURAL; INTRACAUDAL
Status: DISPENSED
Start: 2023-10-23

## (undated) RX ORDER — LIDOCAINE HYDROCHLORIDE 10 MG/ML
INJECTION, SOLUTION INFILTRATION; PERINEURAL
Status: DISPENSED
Start: 2023-10-23

## (undated) RX ORDER — PROPOFOL 10 MG/ML
INJECTION, EMULSION INTRAVENOUS
Status: DISPENSED
Start: 2023-10-23